# Patient Record
Sex: FEMALE | Race: BLACK OR AFRICAN AMERICAN | NOT HISPANIC OR LATINO | Employment: OTHER | URBAN - METROPOLITAN AREA
[De-identification: names, ages, dates, MRNs, and addresses within clinical notes are randomized per-mention and may not be internally consistent; named-entity substitution may affect disease eponyms.]

---

## 2023-04-20 ENCOUNTER — APPOINTMENT (OUTPATIENT)
Dept: PRIMARY CARE | Facility: CLINIC | Age: 68
End: 2023-04-20
Payer: MEDICARE

## 2023-04-27 ENCOUNTER — OFFICE VISIT (OUTPATIENT)
Dept: PRIMARY CARE | Facility: CLINIC | Age: 68
End: 2023-04-27
Payer: MEDICARE

## 2023-04-27 VITALS
SYSTOLIC BLOOD PRESSURE: 158 MMHG | RESPIRATION RATE: 12 BRPM | WEIGHT: 96.5 LBS | TEMPERATURE: 97.9 F | HEART RATE: 64 BPM | OXYGEN SATURATION: 100 % | BODY MASS INDEX: 20.26 KG/M2 | DIASTOLIC BLOOD PRESSURE: 70 MMHG | HEIGHT: 58 IN

## 2023-04-27 DIAGNOSIS — Z76.89 ESTABLISHING CARE WITH NEW DOCTOR, ENCOUNTER FOR: Primary | ICD-10-CM

## 2023-04-27 DIAGNOSIS — N39.46 MIXED STRESS AND URGE URINARY INCONTINENCE: ICD-10-CM

## 2023-04-27 DIAGNOSIS — Z65.8 INADEQUATE SOCIAL SUPPORT: ICD-10-CM

## 2023-04-27 DIAGNOSIS — E11.9 TYPE 2 DIABETES MELLITUS WITHOUT COMPLICATION, WITHOUT LONG-TERM CURRENT USE OF INSULIN (MULTI): Chronic | ICD-10-CM

## 2023-04-27 PROBLEM — R11.0 NAUSEA: Status: ACTIVE | Noted: 2023-04-27

## 2023-04-27 PROBLEM — J30.1 ALLERGIC RHINITIS DUE TO POLLEN: Status: ACTIVE | Noted: 2022-11-09

## 2023-04-27 PROBLEM — R51.9 HEADACHE: Status: ACTIVE | Noted: 2023-04-27

## 2023-04-27 PROBLEM — J30.0 NONALLERGIC VASOMOTOR RHINITIS: Status: ACTIVE | Noted: 2023-04-23

## 2023-04-27 PROBLEM — R42 VERTIGO: Status: ACTIVE | Noted: 2023-04-27

## 2023-04-27 PROBLEM — R32 INCONTINENCE: Status: ACTIVE | Noted: 2023-04-27

## 2023-04-27 PROBLEM — R51.9 HEADACHE: Status: RESOLVED | Noted: 2023-04-27 | Resolved: 2023-04-27

## 2023-04-27 PROBLEM — R11.0 NAUSEA: Status: RESOLVED | Noted: 2023-04-27 | Resolved: 2023-04-27

## 2023-04-27 PROBLEM — H93.13 TINNITUS OF BOTH EARS: Status: ACTIVE | Noted: 2023-04-23

## 2023-04-27 LAB — POC HEMOGLOBIN A1C: 7.4 % (ref 4.2–6.5)

## 2023-04-27 PROCEDURE — 1160F RVW MEDS BY RX/DR IN RCRD: CPT | Performed by: STUDENT IN AN ORGANIZED HEALTH CARE EDUCATION/TRAINING PROGRAM

## 2023-04-27 PROCEDURE — 3077F SYST BP >= 140 MM HG: CPT | Performed by: STUDENT IN AN ORGANIZED HEALTH CARE EDUCATION/TRAINING PROGRAM

## 2023-04-27 PROCEDURE — 1159F MED LIST DOCD IN RCRD: CPT | Performed by: STUDENT IN AN ORGANIZED HEALTH CARE EDUCATION/TRAINING PROGRAM

## 2023-04-27 PROCEDURE — 1036F TOBACCO NON-USER: CPT | Performed by: STUDENT IN AN ORGANIZED HEALTH CARE EDUCATION/TRAINING PROGRAM

## 2023-04-27 PROCEDURE — 99203 OFFICE O/P NEW LOW 30 MIN: CPT | Performed by: STUDENT IN AN ORGANIZED HEALTH CARE EDUCATION/TRAINING PROGRAM

## 2023-04-27 PROCEDURE — 83036 HEMOGLOBIN GLYCOSYLATED A1C: CPT | Performed by: STUDENT IN AN ORGANIZED HEALTH CARE EDUCATION/TRAINING PROGRAM

## 2023-04-27 PROCEDURE — 3078F DIAST BP <80 MM HG: CPT | Performed by: STUDENT IN AN ORGANIZED HEALTH CARE EDUCATION/TRAINING PROGRAM

## 2023-04-27 RX ORDER — DICLOFENAC SODIUM 50 MG/1
50 TABLET, DELAYED RELEASE ORAL
COMMUNITY
Start: 2020-01-08 | End: 2023-08-22 | Stop reason: ALTCHOICE

## 2023-04-27 RX ORDER — IBUPROFEN 800 MG/1
TABLET ORAL
COMMUNITY
Start: 2023-03-06

## 2023-04-27 RX ORDER — VALACYCLOVIR HYDROCHLORIDE 500 MG/1
1000 TABLET, FILM COATED ORAL DAILY PRN
COMMUNITY
Start: 2019-10-08

## 2023-04-27 RX ORDER — TIMOLOL MALEATE 5 MG/ML
SOLUTION/ DROPS OPHTHALMIC
COMMUNITY
Start: 2023-03-08

## 2023-04-27 RX ORDER — MONTELUKAST SODIUM 10 MG/1
10 TABLET ORAL NIGHTLY
COMMUNITY
Start: 2022-05-19

## 2023-04-27 RX ORDER — CLINDAMYCIN HYDROCHLORIDE 150 MG/1
150 CAPSULE ORAL 3 TIMES DAILY
COMMUNITY
Start: 2023-03-06 | End: 2023-08-03 | Stop reason: ALTCHOICE

## 2023-04-27 RX ORDER — ONDANSETRON 4 MG/1
TABLET, ORALLY DISINTEGRATING ORAL
COMMUNITY
End: 2023-08-03 | Stop reason: ALTCHOICE

## 2023-04-27 RX ORDER — CHLORHEXIDINE GLUCONATE ORAL RINSE 1.2 MG/ML
SOLUTION DENTAL
COMMUNITY
Start: 2023-03-06 | End: 2023-08-03 | Stop reason: ALTCHOICE

## 2023-04-27 RX ORDER — BLOOD SUGAR DIAGNOSTIC
STRIP MISCELLANEOUS
COMMUNITY
Start: 2022-08-23

## 2023-04-27 RX ORDER — OXYBUTYNIN CHLORIDE 5 MG/1
5 TABLET, EXTENDED RELEASE ORAL DAILY
COMMUNITY
Start: 2023-04-21 | End: 2023-05-18 | Stop reason: SDUPTHER

## 2023-04-27 RX ORDER — TAMSULOSIN HYDROCHLORIDE 0.4 MG/1
0.4 CAPSULE ORAL DAILY
COMMUNITY
Start: 2023-01-17 | End: 2023-08-03 | Stop reason: ALTCHOICE

## 2023-04-27 RX ORDER — LANCETS
EACH MISCELLANEOUS
COMMUNITY
Start: 2022-08-23

## 2023-04-27 RX ORDER — METFORMIN HYDROCHLORIDE 500 MG/1
500 TABLET, EXTENDED RELEASE ORAL 3 TIMES DAILY
COMMUNITY
Start: 2023-03-08

## 2023-04-27 RX ORDER — MINOXIDIL 10 MG/1
TABLET ORAL
COMMUNITY
Start: 2023-03-10

## 2023-04-27 RX ORDER — SIMVASTATIN 20 MG/1
TABLET, FILM COATED ORAL
COMMUNITY
Start: 2023-03-25

## 2023-04-27 RX ORDER — BETAMETHASONE DIPROPIONATE 0.5 MG/G
LOTION TOPICAL
COMMUNITY
Start: 2023-03-10

## 2023-04-27 ASSESSMENT — ANXIETY QUESTIONNAIRES
GAD7 TOTAL SCORE: 7
2. NOT BEING ABLE TO STOP OR CONTROL WORRYING: SEVERAL DAYS
5. BEING SO RESTLESS THAT IT IS HARD TO SIT STILL: NOT AT ALL
IF YOU CHECKED OFF ANY PROBLEMS ON THIS QUESTIONNAIRE, HOW DIFFICULT HAVE THESE PROBLEMS MADE IT FOR YOU TO DO YOUR WORK, TAKE CARE OF THINGS AT HOME, OR GET ALONG WITH OTHER PEOPLE: SOMEWHAT DIFFICULT
3. WORRYING TOO MUCH ABOUT DIFFERENT THINGS: MORE THAN HALF THE DAYS
4. TROUBLE RELAXING: SEVERAL DAYS
1. FEELING NERVOUS, ANXIOUS, OR ON EDGE: MORE THAN HALF THE DAYS
6. BECOMING EASILY ANNOYED OR IRRITABLE: NOT AT ALL
7. FEELING AFRAID AS IF SOMETHING AWFUL MIGHT HAPPEN: SEVERAL DAYS

## 2023-04-27 ASSESSMENT — PATIENT HEALTH QUESTIONNAIRE - PHQ9
SUM OF ALL RESPONSES TO PHQ9 QUESTIONS 1 AND 2: 2
1. LITTLE INTEREST OR PLEASURE IN DOING THINGS: SEVERAL DAYS
2. FEELING DOWN, DEPRESSED OR HOPELESS: SEVERAL DAYS

## 2023-04-27 ASSESSMENT — LIFESTYLE VARIABLES
AUDIT-C TOTAL SCORE: 0
SKIP TO QUESTIONS 9-10: 1
HOW OFTEN DO YOU HAVE SIX OR MORE DRINKS ON ONE OCCASION: NEVER
HOW OFTEN DO YOU HAVE A DRINK CONTAINING ALCOHOL: NEVER
HOW MANY STANDARD DRINKS CONTAINING ALCOHOL DO YOU HAVE ON A TYPICAL DAY: PATIENT DOES NOT DRINK

## 2023-04-27 NOTE — PATIENT INSTRUCTIONS
A1c: 7.8 to 7.4 Improved.   Cont with your current medications  Annual diabetic eye and foot exam    Referral to our clinical pharmacist.   Return in 3-4 months or as needed

## 2023-04-27 NOTE — PROGRESS NOTES
CHW met with pt in office to discuss her SDOH referral for Social Help with her fiance. CHW gave pt an Older Adult Resource Guide with pamphlets on care giver assistance for her fiance. Pt will look over the papers and let CHW know if she need further help

## 2023-04-27 NOTE — PROGRESS NOTES
Subjective   Patient ID: Nazia Warner is a pleasant 68 y.o. female who presents for Establish Care.  HPI  Patient has significant history of GERD, diabetes (HbA1c 7.8 January 2023), vertigo , urinary incontinence here to establish care.  She is caring for her fiance who is ill.  And patient herself resides in [place] however she goes back and forth between [place] and [place] to care for her significant other.  She is under a lot of stress and feels burned out caring for her fiancé.  Requesting referral to our  for resources.    Regarding her diabetes her HbA1c 3 months ago was 7.8.  Today it has improved to 7.4.  She is currently taking metformin 500 mg 1 tablet in the morning and 2 tablets at bedtime.    She also has mixed urinary incontinence and sees urology in [place].  She was following up with them pelvic physical therapy and would like to establish care with them in [place] as well.    Review of Systems   All other systems reviewed and are negative.      Visit Vitals  /70   Pulse 64   Temp 36.6 °C (97.9 °F)   Resp 12          Objective   Physical Exam  Constitutional:       General: She is not in acute distress.     Appearance: Normal appearance.   HENT:      Head: Normocephalic and atraumatic.   Eyes:      General: No scleral icterus.     Conjunctiva/sclera: Conjunctivae normal.   Cardiovascular:      Rate and Rhythm: Normal rate and regular rhythm.      Heart sounds: Normal heart sounds.   Pulmonary:      Effort: Pulmonary effort is normal.      Breath sounds: Normal breath sounds. No wheezing.   Abdominal:      General: Bowel sounds are normal. There is no distension.      Palpations: Abdomen is soft.      Tenderness: There is no abdominal tenderness.   Musculoskeletal:      Cervical back: Neck supple.      Right lower leg: No edema.      Left lower leg: No edema.   Lymphadenopathy:      Cervical: No cervical adenopathy.   Skin:     General: Skin is warm and dry.   Neurological:       General: No focal deficit present.      Mental Status: She is alert and oriented to person, place, and time.   Psychiatric:         Mood and Affect: Mood normal.         Behavior: Behavior normal.         Assessment/Plan   Problem List Items Addressed This Visit          Endocrine/Metabolic    Type 2 diabetes mellitus without complication (CMS/HCC) (Chronic)     A1c 7.8 1/2023         Relevant Orders    POCT glycosylated hemoglobin (Hb A1C) manually resulted (Completed)    Follow Up In [facility]       Other    Incontinence    Relevant Orders    Referral to Physical Therapy     Other Visit Diagnoses       Establishing care with new doctor, encounter for    -  Primary    Inadequate social support        Relevant Orders    Referral to Community Health Worker - [facility]

## 2023-04-28 ENCOUNTER — TELEMEDICINE (OUTPATIENT)
Dept: PHARMACY | Facility: HOSPITAL | Age: 68
End: 2023-04-28
Payer: MEDICARE

## 2023-04-28 DIAGNOSIS — E11.9 TYPE 2 DIABETES MELLITUS WITHOUT COMPLICATION, WITHOUT LONG-TERM CURRENT USE OF INSULIN (MULTI): Chronic | ICD-10-CM

## 2023-04-28 RX ORDER — DAPAGLIFLOZIN 5 MG/1
5 TABLET, FILM COATED ORAL EVERY MORNING
Qty: 30 TABLET | Refills: 0 | Status: SHIPPED | OUTPATIENT
Start: 2023-04-28 | End: 2023-05-18 | Stop reason: SDUPTHER

## 2023-04-28 NOTE — PROGRESS NOTES
Subjective     Patient ID: Nazia Warner is a 68 y.o. female who presents for Diabetes.    Diabetes  She presents for her initial diabetic visit. She has type 2 diabetes mellitus. Her disease course has been improving. She is compliant with treatment all of the time. She is following a generally healthy diet. Meal planning includes avoidance of concentrated sweets. She participates in exercise intermittently. There is no change in her home blood glucose trend.       Allergies   Allergen Reactions    Iodinated Contrast Media Rash and Hives    Iodine Rash and Wheezing     Other reaction(s): Unknown    Penicillins Rash and Hives    Sulfa (Sulfonamide Antibiotics) Hives, Itching, Rash and Unknown     A    Monosodium Glutamate Other     Dizziness. 6/16/15.bw    Other reaction(s): Other   Dizziness. 6/16/15.bw    Amoxicillin Unknown       Objective       SECONDARY PREVENTION  - Statin? Yes  - ACE-I/ARB? No  - Aspirin? No    Current monitoring regimen:   Patient is using: glucometer    SMBG Readings: 's - PPG  mg/dL     Any episodes of hypoglycemia? No  Hypoglycemia awareness? Yes    There were no vitals taken for this visit.    Pertinent PMH Review:    - PMH of Urinary Tract Infections: Yes - related to antibiotic use     Lab Review  Lab Results   Component Value Date    BILITOT 0.4 01/15/2023    CALCIUM 10.0 01/15/2023    CO2 24 01/15/2023     01/15/2023    CREATININE 1.05 01/15/2023    GLUCOSE 198 (H) 01/15/2023    ALKPHOS 57 01/15/2023    K 4.6 01/15/2023    PROT 8.0 01/15/2023     01/15/2023    AST 23 01/15/2023    ALT 31 01/15/2023    BUN 15 01/15/2023    ANIONGAP 18 01/15/2023    ALBUMIN 4.7 01/15/2023    LIPASE 45 01/15/2023    GFRF 58 (A) 01/15/2023     No results found for: TRIG, CHOL, LDL, LDLCALC, HDL  Lab Results   Component Value Date    HGBA1C 7.4 (A) 04/27/2023     The ASCVD Risk score (Lennox WEAVER, et al., 2019) failed to calculate for the following reasons:    Cannot find a previous  HDL lab    Cannot find a previous total cholesterol lab    Current DM Pharmacotherapy:   Metformin  mg- 1 tablet every morning and 2 tablets every evening     Assessment/Plan     Problem List Items Addressed This Visit          Endocrine/Metabolic    Type 2 diabetes mellitus without complication (CMS/HCC) (Chronic)     A1C above goal. Agreeable to new med.     START Farxiga 5 mg once daily in the morning   CONTINUE Metformin 500 mg ER - 3 tablets daily,          Relevant Medications    dapagliflozin (Farxiga) 5 mg       Type 2 diabetes mellitus, is not at goal. Goal <7%    Follow up: I recommend diabetes care be 3 weeks.    Josie Rodrigeuz PharmD [facility]  Clinical Pharmacist

## 2023-04-28 NOTE — ASSESSMENT & PLAN NOTE
A1C above goal. Agreeable to new med.     START Farxiga 5 mg once daily in the morning   CONTINUE Metformin 500 mg ER - 3 tablets daily,

## 2023-05-18 ENCOUNTER — TELEMEDICINE (OUTPATIENT)
Dept: PHARMACY | Facility: HOSPITAL | Age: 68
End: 2023-05-18
Payer: MEDICARE

## 2023-05-18 DIAGNOSIS — N39.46 MIXED STRESS AND URGE URINARY INCONTINENCE: Primary | ICD-10-CM

## 2023-05-18 DIAGNOSIS — E11.9 TYPE 2 DIABETES MELLITUS WITHOUT COMPLICATION, WITHOUT LONG-TERM CURRENT USE OF INSULIN (MULTI): Chronic | ICD-10-CM

## 2023-05-18 RX ORDER — OXYBUTYNIN CHLORIDE 5 MG/1
5 TABLET, EXTENDED RELEASE ORAL DAILY
Qty: 90 TABLET | Refills: 0 | Status: SHIPPED | OUTPATIENT
Start: 2023-05-18

## 2023-05-18 RX ORDER — DAPAGLIFLOZIN 5 MG/1
5 TABLET, FILM COATED ORAL EVERY MORNING
Qty: 90 TABLET | Refills: 1 | Status: SHIPPED | OUTPATIENT
Start: 2023-05-18 | End: 2023-12-05 | Stop reason: SDUPTHER

## 2023-05-18 NOTE — ASSESSMENT & PLAN NOTE
A1C above goal, home SMBG within goal. Some increased urination/dehydration with Farxiga but manageable per patient.     CONTINUE:   Farxiga 5 mg - once daily   Metformin  mg - 3 tablets daily   CGM requires PA, will submit

## 2023-05-18 NOTE — PROGRESS NOTES
Subjective     Patient ID: Nazia Warner is a 68 y.o. female who presents for Diabetes.    Diabetes  She presents for her initial diabetic visit. She has type 2 diabetes mellitus. Her disease course has been improving. She is compliant with treatment all of the time. She is following a generally healthy diet. Meal planning includes avoidance of concentrated sweets. She participates in exercise intermittently. There is no change in her home blood glucose trend.       Allergies   Allergen Reactions    Iodinated Contrast Media Rash and Hives    Iodine Rash and Wheezing     Other reaction(s): Unknown    Penicillins Rash and Hives    Sulfa (Sulfonamide Antibiotics) Hives, Itching, Rash and Unknown     A    Monosodium Glutamate Other     Dizziness. 6/16/15.bw    Other reaction(s): Other   Dizziness. 6/16/15.bw    Amoxicillin Unknown       Objective       SECONDARY PREVENTION  - Statin? Yes  - ACE-I/ARB? No  - Aspirin? No    Current monitoring regimen:   Patient is using: glucometer    SMBG Readings: 's - PPG  mg/dL     Any episodes of hypoglycemia? No  Hypoglycemia awareness? Yes    There were no vitals taken for this visit.    Pertinent PMH Review:    - PMH of Urinary Tract Infections: Yes - related to antibiotic use     Lab Review  Lab Results   Component Value Date    BILITOT 0.4 01/15/2023    CALCIUM 10.0 01/15/2023    CO2 24 01/15/2023     01/15/2023    CREATININE 1.05 01/15/2023    GLUCOSE 198 (H) 01/15/2023    ALKPHOS 57 01/15/2023    K 4.6 01/15/2023    PROT 8.0 01/15/2023     01/15/2023    AST 23 01/15/2023    ALT 31 01/15/2023    BUN 15 01/15/2023    ANIONGAP 18 01/15/2023    ALBUMIN 4.7 01/15/2023    LIPASE 45 01/15/2023    GFRF 58 (A) 01/15/2023     No results found for: TRIG, CHOL, LDL, LDLCALC, HDL  Lab Results   Component Value Date    HGBA1C 7.4 (A) 04/27/2023     The ASCVD Risk score (Lennox WEAVER, et al., 2019) failed to calculate for the following reasons:    Cannot find a previous  HDL lab    Cannot find a previous total cholesterol lab    Current DM Pharmacotherapy:   Metformin  mg- 1 tablet every morning and 2 tablets every evening   Farxiga 5 mg - 1 tablet once daily     Assessment/Plan     Problem List Items Addressed This Visit          Endocrine/Metabolic    Type 2 diabetes mellitus without complication (CMS/HCC) (Chronic)     A1C above goal, home SMBG within goal. Some increased urination/dehydration with Farxiga but manageable per patient.     CONTINUE:   Farxiga 5 mg - once daily   Metformin  mg - 3 tablets daily   CGM requires PA, will submit          Relevant Medications    dapagliflozin (Farxiga) 5 mg       Other    Incontinence - Primary    Relevant Medications    oxybutynin XL (Ditropan-XL) 5 mg 24 hr tablet       Type 2 diabetes mellitus, is not at goal. Goal <7%    Follow up: I recommend diabetes care be as needed  Will call patient when PA for CGM determined    Josie Rodriguez PharmD [facility]  Clinical Pharmacist

## 2023-06-13 ENCOUNTER — TELEMEDICINE (OUTPATIENT)
Dept: PHARMACY | Facility: HOSPITAL | Age: 68
End: 2023-06-13
Payer: MEDICARE

## 2023-06-13 DIAGNOSIS — E11.9 TYPE 2 DIABETES MELLITUS WITHOUT COMPLICATION, WITHOUT LONG-TERM CURRENT USE OF INSULIN (MULTI): Primary | Chronic | ICD-10-CM

## 2023-06-13 DIAGNOSIS — E11.9 TYPE 2 DIABETES MELLITUS WITHOUT COMPLICATION, WITHOUT LONG-TERM CURRENT USE OF INSULIN (MULTI): Chronic | ICD-10-CM

## 2023-06-13 NOTE — PROGRESS NOTES
Subjective     Patient ID: Nazia Warner is a 68 y.o. female who presents for Diabetes.    Diabetes  She presents for her initial diabetic visit. She has type 2 diabetes mellitus. Her disease course has been improving. She is compliant with treatment all of the time. She is following a generally healthy diet. Meal planning includes avoidance of concentrated sweets. She participates in exercise intermittently. Her home blood glucose trend is decreasing steadily.     Patient splits time between [place] and [place], has PCP's in both states.     Discussed costs associated with Farxiga, patient not eligible at this time for [facility] based on income.     Allergies   Allergen Reactions    Iodinated Contrast Media Rash and Hives    Iodine Rash and Wheezing     Other reaction(s): Unknown    Penicillins Rash and Hives    Sulfa (Sulfonamide Antibiotics) Hives, Itching, Rash and Unknown     A    Monosodium Glutamate Other     Dizziness. 6/16/15.bw    Other reaction(s): Other   Dizziness. 6/16/15.bw    Amoxicillin Unknown       Objective     Current DM Pharmacotherapy:   Farxiga 5 mg - 1 tablet once daily   Metformin  mg - 3 tablets daily     SECONDARY PREVENTION  - Statin? Yes  - ACE-I/ARB? No  - Aspirin? No    Current monitoring regimen:   Patient is using: glucometer    SMBG Readings: -110 - PPG Max 140 mg/dL     Any episodes of hypoglycemia? No  Hypoglycemia awareness? Yes    There were no vitals taken for this visit.    Pertinent PMH Review:    - PMH of Urinary Tract Infections: Yes - related to antibiotic use     Lab Review  Lab Results   Component Value Date    BILITOT 0.4 01/15/2023    CALCIUM 10.0 01/15/2023    CO2 24 01/15/2023     01/15/2023    CREATININE 1.05 01/15/2023    GLUCOSE 198 (H) 01/15/2023    ALKPHOS 57 01/15/2023    K 4.6 01/15/2023    PROT 8.0 01/15/2023     01/15/2023    AST 23 01/15/2023    ALT 31 01/15/2023    BUN 15 01/15/2023    ANIONGAP 18 01/15/2023    ALBUMIN 4.7 01/15/2023     "LIPASE 45 01/15/2023    GFRF 58 (A) 01/15/2023     No results found for: \"TRIG\", \"CHOL\", \"LDL\", \"LDLCALC\", \"HDL\"  Lab Results   Component Value Date    HGBA1C 7.4 (A) 04/27/2023     The ASCVD Risk score (Lennox DK, et al., 2019) failed to calculate for the following reasons:    Cannot find a previous HDL lab    Cannot find a previous total cholesterol lab    Assessment/Plan     Problem List Items Addressed This Visit       Type 2 diabetes mellitus without complication (CMS/HCC) (Chronic)     SMBG improved with addition of Farxiga. No GEORGE. Not eligible for CGM through insurance without insulin therapy or documented history of hypoglycemic episodes.     CONTINUE:   Metformin   Farxiga           Discussed combining Metformin and Farxiga into combination Xigduo (~$43.50 for 30 DS). Given current doses of medications would recommend opting for Xigduo XR  mg once daily.     Type 2 diabetes mellitus, is not at goal. Goal <7%    Follow-up: 8/4/23 @ 10 AM   Obtain new A1C/CMP on or after 7/20/23    Josie Rodriguez PharmART [facility]  Clinical Pharmacist     "

## 2023-06-13 NOTE — ASSESSMENT & PLAN NOTE
SMBG improved with addition of Farxiga. No GEORGE. Not eligible for CGM through insurance without insulin therapy or documented history of hypoglycemic episodes.     CONTINUE:   Metformin   Farxiga

## 2023-08-03 ENCOUNTER — TELEMEDICINE (OUTPATIENT)
Dept: PHARMACY | Facility: HOSPITAL | Age: 68
End: 2023-08-03
Payer: MEDICARE

## 2023-08-03 DIAGNOSIS — E11.9 TYPE 2 DIABETES MELLITUS WITHOUT COMPLICATION, WITHOUT LONG-TERM CURRENT USE OF INSULIN (MULTI): Chronic | ICD-10-CM

## 2023-08-03 NOTE — PROGRESS NOTES
Subjective     Patient ID: Nazia Warner is a 68 y.o. female who presents for Diabetes.    Diabetes  She presents for her initial diabetic visit. She has type 2 diabetes mellitus. Her disease course has been stable. She is compliant with treatment all of the time. She is following a generally healthy diet. Meal planning includes avoidance of concentrated sweets. She participates in exercise intermittently. There is no change in her home blood glucose trend.     Patient splits time between [place] and [place], has PCP's in both states.     Most recent A1C (7/18) taken in [place]: 6.7%  Most recent eGFR: 74      Allergies   Allergen Reactions    Iodinated Contrast Media Rash and Hives    Iodine Rash and Wheezing     Other reaction(s): Unknown    Penicillins Rash and Hives    Sulfa (Sulfonamide Antibiotics) Hives, Itching, Rash and Unknown     A    Monosodium Glutamate Other     Dizziness. 6/16/15.bw    Other reaction(s): Other   Dizziness. 6/16/15.bw    Amoxicillin Unknown       Objective     Current DM Pharmacotherapy:   Farxiga 5 mg - 1 tablet once daily   Metformin  mg - 3 tablets daily (often skips 3rd tablet if BG <100 mg/dL at night)     SECONDARY PREVENTION  - Statin? Yes  - ACE-I/ARB? No  - Aspirin? No    Current monitoring regimen:   Patient is using: glucometer    SMBG Readings Very well controlled     Any episodes of hypoglycemia? No  Hypoglycemia awareness? Yes    There were no vitals taken for this visit.    Pertinent PMH Review:    - PMH of Urinary Tract Infections: Yes - related to antibiotic use     Lab Review  Lab Results   Component Value Date    BILITOT 0.4 01/15/2023    CALCIUM 10.0 01/15/2023    CO2 24 01/15/2023     01/15/2023    CREATININE 1.05 01/15/2023    GLUCOSE 198 (H) 01/15/2023    ALKPHOS 57 01/15/2023    K 4.6 01/15/2023    PROT 8.0 01/15/2023     01/15/2023    AST 23 01/15/2023    ALT 31 01/15/2023    BUN 15 01/15/2023    ANIONGAP 18 01/15/2023    ALBUMIN 4.7 01/15/2023  "   LIPASE 45 01/15/2023    GFRF 58 (A) 01/15/2023     No results found for: \"TRIG\", \"CHOL\", \"LDL\", \"LDLCALC\", \"HDL\"  Lab Results   Component Value Date    HGBA1C 7.4 (A) 04/27/2023     The ASCVD Risk score (Lennox WEAVER, et al., 2019) failed to calculate for the following reasons:    Cannot find a previous HDL lab    Cannot find a previous total cholesterol lab    Assessment/Plan     Problem List Items Addressed This Visit       Type 2 diabetes mellitus without complication (CMS/HCC) (Chronic)     BG well controlled on current doses. No ADEs.     CONTINUE:   Farxiga 5 mg - 1 tablet once daily   Metformin  mg - 3 tablets daily          Relevant Orders    Follow Up In [facility] Pharmacy     Discussed combining Metformin and Farxiga into combination Xigduo (~$43.50 for 30 DS). Given current doses of medications would recommend opting for Xigduo XR  mg once daily.     Type 2 diabetes mellitus, is not at goal. Goal <7%    Follow-up: 8 weeks    Josie Rodriguez PharmD [facility]  Clinical Pharmacist     "

## 2023-08-03 NOTE — ASSESSMENT & PLAN NOTE
BG well controlled on current doses. No ADEs.     CONTINUE:   Farxiga 5 mg - 1 tablet once daily   Metformin  mg - 3 tablets daily

## 2023-08-04 ENCOUNTER — APPOINTMENT (OUTPATIENT)
Dept: PHARMACY | Facility: HOSPITAL | Age: 68
End: 2023-08-04
Payer: MEDICARE

## 2023-08-22 ENCOUNTER — OFFICE VISIT (OUTPATIENT)
Dept: PRIMARY CARE | Facility: CLINIC | Age: 68
End: 2023-08-22
Payer: MEDICARE

## 2023-08-22 VITALS
HEART RATE: 67 BPM | RESPIRATION RATE: 16 BRPM | TEMPERATURE: 97.2 F | WEIGHT: 92.2 LBS | BODY MASS INDEX: 19.35 KG/M2 | OXYGEN SATURATION: 100 % | HEIGHT: 58 IN | SYSTOLIC BLOOD PRESSURE: 138 MMHG | DIASTOLIC BLOOD PRESSURE: 62 MMHG

## 2023-08-22 DIAGNOSIS — Z78.0 ASYMPTOMATIC MENOPAUSAL STATE: ICD-10-CM

## 2023-08-22 DIAGNOSIS — H93.13 TINNITUS OF BOTH EARS: Primary | ICD-10-CM

## 2023-08-22 DIAGNOSIS — F32.A DEPRESSION, UNSPECIFIED DEPRESSION TYPE: ICD-10-CM

## 2023-08-22 PROCEDURE — 99213 OFFICE O/P EST LOW 20 MIN: CPT | Performed by: STUDENT IN AN ORGANIZED HEALTH CARE EDUCATION/TRAINING PROGRAM

## 2023-08-22 PROCEDURE — 1159F MED LIST DOCD IN RCRD: CPT | Performed by: STUDENT IN AN ORGANIZED HEALTH CARE EDUCATION/TRAINING PROGRAM

## 2023-08-22 PROCEDURE — 1160F RVW MEDS BY RX/DR IN RCRD: CPT | Performed by: STUDENT IN AN ORGANIZED HEALTH CARE EDUCATION/TRAINING PROGRAM

## 2023-08-22 PROCEDURE — 3078F DIAST BP <80 MM HG: CPT | Performed by: STUDENT IN AN ORGANIZED HEALTH CARE EDUCATION/TRAINING PROGRAM

## 2023-08-22 PROCEDURE — 1036F TOBACCO NON-USER: CPT | Performed by: STUDENT IN AN ORGANIZED HEALTH CARE EDUCATION/TRAINING PROGRAM

## 2023-08-22 PROCEDURE — 3075F SYST BP GE 130 - 139MM HG: CPT | Performed by: STUDENT IN AN ORGANIZED HEALTH CARE EDUCATION/TRAINING PROGRAM

## 2023-08-22 RX ORDER — UBIDECARENONE 30 MG
60 CAPSULE ORAL
COMMUNITY

## 2023-08-22 RX ORDER — DAPAGLIFLOZIN AND METFORMIN HYDROCHLORIDE 10; 1000 MG/1; MG/1
TABLET, FILM COATED, EXTENDED RELEASE ORAL
COMMUNITY
Start: 2023-06-13 | End: 2023-08-22 | Stop reason: ALTCHOICE

## 2023-08-22 RX ORDER — AZELASTINE 1 MG/ML
SPRAY, METERED NASAL EVERY 12 HOURS
COMMUNITY
Start: 2018-07-24

## 2023-08-22 RX ORDER — DAPAGLIFLOZIN AND METFORMIN HYDROCHLORIDE 10; 1000 MG/1; MG/1
TABLET, FILM COATED, EXTENDED RELEASE ORAL
COMMUNITY
End: 2023-09-21

## 2023-08-22 ASSESSMENT — LIFESTYLE VARIABLES
SKIP TO QUESTIONS 9-10: 1
HOW OFTEN DO YOU HAVE A DRINK CONTAINING ALCOHOL: NEVER
AUDIT-C TOTAL SCORE: 0
HOW OFTEN DO YOU HAVE SIX OR MORE DRINKS ON ONE OCCASION: NEVER
HOW MANY STANDARD DRINKS CONTAINING ALCOHOL DO YOU HAVE ON A TYPICAL DAY: PATIENT DOES NOT DRINK

## 2023-08-22 ASSESSMENT — PATIENT HEALTH QUESTIONNAIRE - PHQ9
2. FEELING DOWN, DEPRESSED OR HOPELESS: NOT AT ALL
1. LITTLE INTEREST OR PLEASURE IN DOING THINGS: NOT AT ALL
SUM OF ALL RESPONSES TO PHQ9 QUESTIONS 1 & 2: 0

## 2023-08-22 NOTE — PATIENT INSTRUCTIONS
Please schedule your medicare wellness visit   Bone density ordered  Referral to ENT and audiology  Referral to psychiatry for depression evaluation

## 2023-08-22 NOTE — PROGRESS NOTES
Subjective   Patient ID: Nazia Warner is a pleasant 68 y.o. female with significant history of type 2 diabetes (HbA1c 6.7 in July 2023), vertigo, urinary incontinence GERD who presents for Tinnitus.  HPI  Patient is here for follow-up.  She has been experiencing tinnitus.  She has tinnitus since 2015. No dizziness   She was supposed to have hearing test. Needs a new referral   To audiology.   She also reports that lately she has been depressed and overwhelmed taking care of her ill  would like to see psychiatry.  Denies any HI or SI.  Reports that many years ago while she was in college and she was treated for depression however she has not been on any medications.  Patient is provided with resources for behavioral health facilities.    Review of Systems   All other systems reviewed and are negative.      Visit Vitals  /62   Pulse 67   Temp 36.2 °C (97.2 °F)   Resp 16          Objective   Physical Exam  Constitutional:       General: She is not in acute distress.     Appearance: Normal appearance.   HENT:      Head: Normocephalic and atraumatic.   Eyes:      General: No scleral icterus.     Conjunctiva/sclera: Conjunctivae normal.   Cardiovascular:      Rate and Rhythm: Normal rate and regular rhythm.      Heart sounds: Normal heart sounds.   Pulmonary:      Effort: Pulmonary effort is normal.      Breath sounds: Normal breath sounds. No wheezing.   Abdominal:      General: Bowel sounds are normal. There is no distension.      Palpations: Abdomen is soft.      Tenderness: There is no abdominal tenderness.   Musculoskeletal:      Cervical back: Neck supple.      Right lower leg: No edema.      Left lower leg: No edema.   Lymphadenopathy:      Cervical: No cervical adenopathy.   Skin:     General: Skin is warm and dry.   Neurological:      General: No focal deficit present.      Mental Status: She is alert and oriented to person, place, and time.   Psychiatric:         Mood and Affect: Mood normal.          Behavior: Behavior normal.         Assessment/Plan   Problem List Items Addressed This Visit       Tinnitus of both ears - Primary    Relevant Orders    Referral to Audiology    Referral to ENT     Other Visit Diagnoses       Asymptomatic menopausal state        Relevant Orders    XR DEXA bone density    Depression, unspecified depression type        Relevant Orders    Referral to Psychiatry

## 2023-09-21 ENCOUNTER — TELEMEDICINE (OUTPATIENT)
Dept: PHARMACY | Facility: HOSPITAL | Age: 68
End: 2023-09-21
Payer: MEDICARE

## 2023-09-21 DIAGNOSIS — E11.9 TYPE 2 DIABETES MELLITUS WITHOUT COMPLICATION, WITHOUT LONG-TERM CURRENT USE OF INSULIN (MULTI): Chronic | ICD-10-CM

## 2023-09-21 NOTE — ASSESSMENT & PLAN NOTE
Patient continues to be well controlled, continue med regimen as prescribed at this time. Will keep Metformin and Farxiga separate due to cost concerns.

## 2023-09-21 NOTE — PROGRESS NOTES
Subjective     Patient ID: Nazia Warner is a 68 y.o. female who presents for Diabetes.    Diabetes  She presents for her initial diabetic visit. She has type 2 diabetes mellitus. Her disease course has been stable. She is compliant with treatment all of the time. She is following a generally healthy diet. Meal planning includes avoidance of concentrated sweets. She participates in exercise intermittently. There is no change in her home blood glucose trend.     Patient splits time between [place] and [place], has PCP's in both states.     Most recent A1C (7/18) taken in [place]: 6.7%  Most recent eGFR: 74      Allergies   Allergen Reactions    Iodinated Contrast Media Rash and Hives    Iodine Rash and Wheezing     Other reaction(s): Unknown    Penicillins Rash and Hives    Sulfa (Sulfonamide Antibiotics) Hives, Itching, Rash and Unknown     A    Monosodium Glutamate Other     Dizziness. 6/16/15.bw    Other reaction(s): Other   Dizziness. 6/16/15.bw    Amoxicillin Unknown       Objective     Current DM Pharmacotherapy:   Farxiga 5 mg - 1 tablet once daily   Metformin  mg - 3 tablets daily (often skips 3rd tablet if BG <100 mg/dL at night)     SECONDARY PREVENTION  - Statin? Yes  - ACE-I/ARB? No  - Aspirin? No    Current monitoring regimen:   Patient is using: glucometer    SMBG Readings Very well controlled     Any episodes of hypoglycemia? No  Hypoglycemia awareness? Yes    There were no vitals taken for this visit.    Pertinent PMH Review:    - PMH of Urinary Tract Infections: Yes - related to antibiotic use     Lab Review  Lab Results   Component Value Date    BILITOT 0.4 01/15/2023    CALCIUM 10.0 01/15/2023    CO2 24 01/15/2023     01/15/2023    CREATININE 1.05 01/15/2023    GLUCOSE 198 (H) 01/15/2023    ALKPHOS 57 01/15/2023    K 4.6 01/15/2023    PROT 8.0 01/15/2023     01/15/2023    AST 23 01/15/2023    ALT 31 01/15/2023    BUN 15 01/15/2023    ANIONGAP 18 01/15/2023    ALBUMIN 4.7 01/15/2023  "   LIPASE 45 01/15/2023    GFRF 58 (A) 01/15/2023     No results found for: \"TRIG\", \"CHOL\", \"LDL\", \"LDLCALC\", \"HDL\"  Lab Results   Component Value Date    HGBA1C 7.4 (A) 04/27/2023     The ASCVD Risk score (Lennox WEAVER, et al., 2019) failed to calculate for the following reasons:    Cannot find a previous HDL lab    Cannot find a previous total cholesterol lab    Assessment/Plan     Problem List Items Addressed This Visit       Type 2 diabetes mellitus without complication (CMS/HCC) (Chronic)     Patient continues to be well controlled, continue med regimen as prescribed at this time. Will keep Metformin and Farxiga separate due to cost concerns.          Discussed combining Metformin and Farxiga into combination Xigduo (~$43.50 for 30 DS). Given current doses of medications would recommend opting for Xigduo XR  mg once daily.     Type 2 diabetes mellitus, is not at goal. Goal <7%    Follow-up: as needed - pt to call if in need of refills     Josie Rodriguez PharmD [facility]  Clinical Pharmacist     "

## 2023-10-03 ENCOUNTER — OFFICE VISIT (OUTPATIENT)
Dept: PRIMARY CARE | Facility: CLINIC | Age: 68
End: 2023-10-03
Payer: MEDICARE

## 2023-10-03 VITALS
SYSTOLIC BLOOD PRESSURE: 120 MMHG | BODY MASS INDEX: 19.44 KG/M2 | OXYGEN SATURATION: 99 % | WEIGHT: 92.6 LBS | HEIGHT: 58 IN | RESPIRATION RATE: 12 BRPM | DIASTOLIC BLOOD PRESSURE: 56 MMHG | TEMPERATURE: 98 F | HEART RATE: 66 BPM

## 2023-10-03 DIAGNOSIS — E46 PROTEIN-CALORIE MALNUTRITION, UNSPECIFIED SEVERITY (MULTI): ICD-10-CM

## 2023-10-03 DIAGNOSIS — Z00.00 MEDICARE ANNUAL WELLNESS VISIT, SUBSEQUENT: Primary | ICD-10-CM

## 2023-10-03 DIAGNOSIS — Z01.419 WELL WOMAN EXAM WITH ROUTINE GYNECOLOGICAL EXAM: ICD-10-CM

## 2023-10-03 DIAGNOSIS — Z13.89 ENCOUNTER FOR SCREENING FOR OTHER DISORDER: ICD-10-CM

## 2023-10-03 DIAGNOSIS — E78.5 HYPERLIPIDEMIA LDL GOAL <100: ICD-10-CM

## 2023-10-03 DIAGNOSIS — E11.9 TYPE 2 DIABETES MELLITUS WITHOUT COMPLICATION, WITHOUT LONG-TERM CURRENT USE OF INSULIN (MULTI): Chronic | ICD-10-CM

## 2023-10-03 DIAGNOSIS — Z23 FLU VACCINE NEED: ICD-10-CM

## 2023-10-03 PROBLEM — R94.120 ABNORMAL OTOACOUSTIC EMISSIONS TEST: Status: ACTIVE | Noted: 2023-10-03

## 2023-10-03 PROCEDURE — 99397 PER PM REEVAL EST PAT 65+ YR: CPT | Performed by: STUDENT IN AN ORGANIZED HEALTH CARE EDUCATION/TRAINING PROGRAM

## 2023-10-03 PROCEDURE — 3074F SYST BP LT 130 MM HG: CPT | Performed by: STUDENT IN AN ORGANIZED HEALTH CARE EDUCATION/TRAINING PROGRAM

## 2023-10-03 PROCEDURE — 1170F FXNL STATUS ASSESSED: CPT | Performed by: STUDENT IN AN ORGANIZED HEALTH CARE EDUCATION/TRAINING PROGRAM

## 2023-10-03 PROCEDURE — G0439 PPPS, SUBSEQ VISIT: HCPCS | Performed by: STUDENT IN AN ORGANIZED HEALTH CARE EDUCATION/TRAINING PROGRAM

## 2023-10-03 PROCEDURE — 1159F MED LIST DOCD IN RCRD: CPT | Performed by: STUDENT IN AN ORGANIZED HEALTH CARE EDUCATION/TRAINING PROGRAM

## 2023-10-03 PROCEDURE — G0008 ADMIN INFLUENZA VIRUS VAC: HCPCS | Performed by: STUDENT IN AN ORGANIZED HEALTH CARE EDUCATION/TRAINING PROGRAM

## 2023-10-03 PROCEDURE — 3078F DIAST BP <80 MM HG: CPT | Performed by: STUDENT IN AN ORGANIZED HEALTH CARE EDUCATION/TRAINING PROGRAM

## 2023-10-03 PROCEDURE — G0444 DEPRESSION SCREEN ANNUAL: HCPCS | Performed by: STUDENT IN AN ORGANIZED HEALTH CARE EDUCATION/TRAINING PROGRAM

## 2023-10-03 PROCEDURE — 1160F RVW MEDS BY RX/DR IN RCRD: CPT | Performed by: STUDENT IN AN ORGANIZED HEALTH CARE EDUCATION/TRAINING PROGRAM

## 2023-10-03 PROCEDURE — 1036F TOBACCO NON-USER: CPT | Performed by: STUDENT IN AN ORGANIZED HEALTH CARE EDUCATION/TRAINING PROGRAM

## 2023-10-03 PROCEDURE — 90662 IIV NO PRSV INCREASED AG IM: CPT | Performed by: STUDENT IN AN ORGANIZED HEALTH CARE EDUCATION/TRAINING PROGRAM

## 2023-10-03 RX ORDER — ONDANSETRON 4 MG/1
TABLET, ORALLY DISINTEGRATING ORAL
COMMUNITY
Start: 2015-06-17 | End: 2024-04-02 | Stop reason: WASHOUT

## 2023-10-03 RX ORDER — NITROGLYCERIN 20 MG/G
OINTMENT TOPICAL
COMMUNITY
Start: 2023-08-29

## 2023-10-03 ASSESSMENT — ACTIVITIES OF DAILY LIVING (ADL)
DRESSING: INDEPENDENT
DOING_HOUSEWORK: INDEPENDENT
GROCERY_SHOPPING: INDEPENDENT
MANAGING_FINANCES: INDEPENDENT
TAKING_MEDICATION: INDEPENDENT
BATHING: INDEPENDENT

## 2023-10-03 ASSESSMENT — PATIENT HEALTH QUESTIONNAIRE - PHQ9
SUM OF ALL RESPONSES TO PHQ9 QUESTIONS 1 AND 2: 0
2. FEELING DOWN, DEPRESSED OR HOPELESS: NOT AT ALL
1. LITTLE INTEREST OR PLEASURE IN DOING THINGS: NOT AT ALL

## 2023-10-03 NOTE — PROGRESS NOTES
"Subjective   Reason for Visit: Nazia Warner is a pleasant 68 y.o. female with significant history of diabetes (HbA1c 6.7 7/18/2023), hyperlipidemia here for a Medicare Wellness visit.     Past Medical, Surgical, and Family History reviewed and updated in chart.    Reviewed all medications by prescribing practitioner or clinical pharmacist (such as prescriptions, OTCs, herbal therapies and supplements) and documented in the medical record.    HPI    Patient is here for Medicare wellness visit.  He is inquiring about a support group for patients who are on dialysis.  Her   Mr Romaine Gordon is currently on dialysis and she feels that she needs some support to go through with this.    Health maintenance:  Mammogram: 4/21/2023  Bone density: Ordered on 8/22/2022, pending completion in Dec.   Pap smear: referral to GYN.   Colonoscopy: 2014, repeat in 10 years.     Immunizations:  Flu shot today  UTD with PNA   Due for second dose of shingle     Patient Care Team:  Dorota Haynes MD as PCP - General (Hospitalist)     Review of Systems   All other systems reviewed and are negative.      Objective   Vitals:  /56 (Patient Position: Sitting)   Pulse 66   Temp 36.7 °C (98 °F)   Resp 12   Ht 1.473 m (4' 10\")   Wt (!) 42 kg (92 lb 9.6 oz)   SpO2 99%   BMI 19.35 kg/m²       Physical Exam  Constitutional:       General: She is not in acute distress.     Appearance: Normal appearance.   HENT:      Head: Normocephalic and atraumatic.   Eyes:      General: No scleral icterus.     Conjunctiva/sclera: Conjunctivae normal.   Cardiovascular:      Rate and Rhythm: Normal rate and regular rhythm.      Heart sounds: Normal heart sounds.   Pulmonary:      Effort: Pulmonary effort is normal.      Breath sounds: Normal breath sounds. No wheezing.   Abdominal:      General: Bowel sounds are normal. There is no distension.      Palpations: Abdomen is soft.      Tenderness: There is no abdominal tenderness.   Musculoskeletal:    "   Cervical back: Neck supple.      Right lower leg: No edema.      Left lower leg: No edema.   Lymphadenopathy:      Cervical: No cervical adenopathy.   Skin:     General: Skin is warm and dry.   Neurological:      General: No focal deficit present.      Mental Status: She is alert and oriented to person, place, and time.   Psychiatric:         Mood and Affect: Mood normal.         Behavior: Behavior normal.         Assessment/Plan   Problem List Items Addressed This Visit       Type 2 diabetes mellitus without complication (CMS/HCC) (Chronic)    Overview     Formatting of this note might be different from the original. 6..2015neg retinop         Relevant Orders    Comprehensive Metabolic Panel    CBC and Auto Differential    Hemoglobin A1C    Albumin , Urine Random    Hyperlipidemia LDL goal <100    Relevant Orders    Lipid Panel    Protein-calorie malnutrition, unspecified severity (CMS/HCC)    Current Assessment & Plan     Stable, will continue to monitor          Other Visit Diagnoses       Medicare annual wellness visit, subsequent    -  Primary    Encounter for screening for other disorder        Well woman exam with routine gynecological exam        Relevant Orders    Referral to Obstetrics / Gynecology    Flu vaccine need        Relevant Orders    Flu vaccine, quadrivalent, high-dose, preservative free, age 65y+ (FLUZONE) (Completed)

## 2023-10-03 NOTE — PATIENT INSTRUCTIONS
Flu shot today   Please get your second shingles vaccine at your local pharmacy   Continue with current medications.  Blood work before your next visit.  If blood work or imaging were ordered during your visit, all the nonurgent lab results will be discussed with you at your next office visit.  Please arrive 15 minutes before your appointment.   Return to office in 4-6 months or as needed

## 2023-10-18 PROBLEM — N39.46 MIXED INCONTINENCE: Status: ACTIVE | Noted: 2023-10-18

## 2023-10-18 PROBLEM — H93.13 BILATERAL TINNITUS: Status: ACTIVE | Noted: 2023-10-18

## 2023-10-18 RX ORDER — PREDNISONE 20 MG/1
60 TABLET ORAL DAILY
COMMUNITY
End: 2023-10-19 | Stop reason: ALTCHOICE

## 2023-10-18 RX ORDER — MECLIZINE HYDROCHLORIDE 25 MG/1
12.5 TABLET ORAL EVERY 8 HOURS PRN
COMMUNITY

## 2023-10-19 ENCOUNTER — OFFICE VISIT (OUTPATIENT)
Dept: OTOLARYNGOLOGY | Facility: CLINIC | Age: 68
End: 2023-10-19
Payer: MEDICARE

## 2023-10-19 VITALS — TEMPERATURE: 97.4 F | WEIGHT: 93.5 LBS | BODY MASS INDEX: 19.54 KG/M2

## 2023-10-19 DIAGNOSIS — H61.23 BILATERAL IMPACTED CERUMEN: ICD-10-CM

## 2023-10-19 DIAGNOSIS — M26.69: ICD-10-CM

## 2023-10-19 DIAGNOSIS — H93.13 BILATERAL TINNITUS: Primary | ICD-10-CM

## 2023-10-19 DIAGNOSIS — R94.120 ABNORMAL OTOACOUSTIC EMISSIONS TEST: ICD-10-CM

## 2023-10-19 PROCEDURE — 1160F RVW MEDS BY RX/DR IN RCRD: CPT | Performed by: NURSE PRACTITIONER

## 2023-10-19 PROCEDURE — 99204 OFFICE O/P NEW MOD 45 MIN: CPT | Performed by: NURSE PRACTITIONER

## 2023-10-19 PROCEDURE — 1159F MED LIST DOCD IN RCRD: CPT | Performed by: NURSE PRACTITIONER

## 2023-10-19 PROCEDURE — 1036F TOBACCO NON-USER: CPT | Performed by: NURSE PRACTITIONER

## 2023-10-19 PROCEDURE — 69210 REMOVE IMPACTED EAR WAX UNI: CPT | Performed by: NURSE PRACTITIONER

## 2023-10-19 ASSESSMENT — PATIENT HEALTH QUESTIONNAIRE - PHQ9
2. FEELING DOWN, DEPRESSED OR HOPELESS: SEVERAL DAYS
1. LITTLE INTEREST OR PLEASURE IN DOING THINGS: SEVERAL DAYS
SUM OF ALL RESPONSES TO PHQ9 QUESTIONS 1 AND 2: 2

## 2023-10-19 NOTE — PATIENT INSTRUCTIONS
DISCUSSION TINNITUS   The likely etiology of the tinnitus was reviewed. The various masking and distraction techniques were discussed including hearing aids, tinnitus retraining therapy, sounds to coverup tinnitus, biofeedback, cognitive behavioral therapy, acupuncture. All questions were answered to the patient's satisfaction.

## 2023-10-19 NOTE — PROGRESS NOTES
Subjective   Patient ID: Nazia Warner is a 68 y.o. female who presents for Tinnitus.  HPI    Both ears  It has been ongoing since 2015 since after her initial vertigo episode.  Bothers her sleep and activity  She cannot tolerate loud noises  She stays away from caffeine      The vertigo has resolved since 2015.  She still have imbalance occasional which does not have specific trigger.  She does not have ear fullness.    Denies family history of ear disease, noise exposure, ear surgery or ear trauma.  No ear drainage or pain.     She has used Flonase and Zyrtec which she states that the Zyrtec seem to help some.       Audiogram findings this is my personal interpretation showed: Normal hearing from 250 Hz to 1500 Hz sloping to very mild sensorineural hearing loss bilateral ears rising to normal from 3000 Hz to 8000 Hz.  Normal tympanogram bilateral.  Acoustic reflexes present right and left ipsilateral.  Excellent word discrimination bilateral ears.  OAE absent at 2003 1000 Hz right ear.  Absent OAE at 6008 1000 Hz left ear.    Review of Systems    All other systems have been reviewed and are negative for complaints except for those mentioned in history of present illness, past medical history and problem list       Objective   Physical Exam    CONSTITUTIONAL: No acute distress, normal facial features; No fever; no chills  VOICE: No hoarseness or other audible abnormality  RESPIRATION: Breathing comfortably, no stridor; normal breathing effort  CV: No cyanosis visible on the face and neck area  EYES:Pupils equal and round ; no erythema; conjunctiva clear; sclera white  NEURO: Alert and oriented, able to raise eyebrows symmetrical bilateral, smile with no facial droop, able to swallow  HEAD AND FACE: Symmetric facial features, no masses or lesions    Right ear examination: External ear normal.   Left ear examination: External ear normal.     NOSE: External nose midline  ORAL CAVITY: No lesions of external lips; uvula  is midline; tongue with good mobility; no gross mass in oral cavity; mucosa appears pink   NECK/LYMPH: No obvious deformity or lesions; trachea is midline  PSYCH: Alert and oriented with appropriate mood and affect     Patient ID: Nazia Warner is a 68 y.o. female.    Procedures    Cerumen removal    Consent:  The planned procedure is discussed including possible risk, benefits and alternative treatments reviewed.  Verbal consent is obtained.    Indications:Obstructed cerumen is noted affecting hearing and causing discomfort.    Procedure: The ears are examined microscopically.  Using speculum, alligator, #7, #5 suction the obstructive cerumen in both the ears were removed.    Findings: Cerumen and epithelial debris obstruction in both external auditory canals.  Inspection of tympanic membrane after cleaning showed intact with no effusion, retraction or perforation.    Post procedure: The patient tolerated the procedure well without complications       Assessment/Plan       Problem List Items Addressed This Visit       Abnormal otoacoustic emissions test    Bilateral tinnitus - Primary     Other Visit Diagnoses       Bilateral impacted cerumen        Temporomandibular joint crepitus             DISCUSSION TINNITUS   The likely etiology of the tinnitus was reviewed. The various masking and distraction techniques were discussed including hearing aids, tinnitus retraining therapy, sounds to coverup tinnitus, biofeedback, cognitive behavioral therapy, acupuncture.  Patient is not currently qualified for hearing aids.  The cerumen was successfully removed using microscope and instruments.  She may follow-up in 1 year with repeat hearing test.  Advised patient that if tinnitus worsens accompanied by dizziness or vertigo to call office.  I also recommended for patient to see her dentist for mouthguard for the TMJ crepitus.  All questions were answered to the patient's satisfaction.

## 2023-10-30 ENCOUNTER — APPOINTMENT (OUTPATIENT)
Dept: BEHAVIORAL HEALTH | Facility: CLINIC | Age: 68
End: 2023-10-30
Payer: MEDICARE

## 2023-12-05 DIAGNOSIS — E11.9 TYPE 2 DIABETES MELLITUS WITHOUT COMPLICATION, WITHOUT LONG-TERM CURRENT USE OF INSULIN (MULTI): Chronic | ICD-10-CM

## 2023-12-05 RX ORDER — DAPAGLIFLOZIN 5 MG/1
5 TABLET, FILM COATED ORAL EVERY MORNING
Qty: 90 TABLET | Refills: 1 | Status: SHIPPED | OUTPATIENT
Start: 2023-12-05 | End: 2024-04-02 | Stop reason: SDUPTHER

## 2023-12-06 ENCOUNTER — ANCILLARY PROCEDURE (OUTPATIENT)
Dept: RADIOLOGY | Facility: CLINIC | Age: 68
End: 2023-12-06
Payer: MEDICARE

## 2023-12-06 DIAGNOSIS — Z78.0 ASYMPTOMATIC MENOPAUSAL STATE: ICD-10-CM

## 2023-12-06 PROCEDURE — 77080 DXA BONE DENSITY AXIAL: CPT | Performed by: RADIOLOGY

## 2023-12-06 PROCEDURE — 77080 DXA BONE DENSITY AXIAL: CPT

## 2023-12-14 ENCOUNTER — APPOINTMENT (OUTPATIENT)
Dept: RADIOLOGY | Facility: HOSPITAL | Age: 68
End: 2023-12-14
Payer: MEDICARE

## 2023-12-14 ENCOUNTER — TELEPHONE (OUTPATIENT)
Dept: PRIMARY CARE | Facility: CLINIC | Age: 68
End: 2023-12-14

## 2023-12-14 ENCOUNTER — HOSPITAL ENCOUNTER (EMERGENCY)
Facility: HOSPITAL | Age: 68
Discharge: HOME | End: 2023-12-14
Attending: EMERGENCY MEDICINE
Payer: MEDICARE

## 2023-12-14 VITALS
WEIGHT: 90 LBS | HEART RATE: 80 BPM | TEMPERATURE: 98.2 F | BODY MASS INDEX: 18.89 KG/M2 | SYSTOLIC BLOOD PRESSURE: 126 MMHG | RESPIRATION RATE: 18 BRPM | OXYGEN SATURATION: 100 % | DIASTOLIC BLOOD PRESSURE: 80 MMHG | HEIGHT: 58 IN

## 2023-12-14 DIAGNOSIS — N20.1 URETERAL STONE: Primary | ICD-10-CM

## 2023-12-14 DIAGNOSIS — E11.9 TYPE 2 DIABETES MELLITUS WITHOUT COMPLICATION, WITHOUT LONG-TERM CURRENT USE OF INSULIN (MULTI): Chronic | ICD-10-CM

## 2023-12-14 LAB
ALBUMIN SERPL BCP-MCNC: 4.2 G/DL (ref 3.4–5)
ALP SERPL-CCNC: 40 U/L (ref 33–136)
ALT SERPL W P-5'-P-CCNC: 21 U/L (ref 7–45)
ANION GAP SERPL CALC-SCNC: 13 MMOL/L (ref 10–20)
APPEARANCE UR: CLEAR
AST SERPL W P-5'-P-CCNC: 29 U/L (ref 9–39)
BASOPHILS # BLD AUTO: 0.02 X10*3/UL (ref 0–0.1)
BASOPHILS NFR BLD AUTO: 0.3 %
BILIRUB SERPL-MCNC: 0.4 MG/DL (ref 0–1.2)
BILIRUB UR STRIP.AUTO-MCNC: NEGATIVE MG/DL
BUN SERPL-MCNC: 21 MG/DL (ref 6–23)
CALCIUM SERPL-MCNC: 8.8 MG/DL (ref 8.6–10.3)
CHLORIDE SERPL-SCNC: 101 MMOL/L (ref 98–107)
CO2 SERPL-SCNC: 22 MMOL/L (ref 21–32)
COLOR UR: YELLOW
CREAT SERPL-MCNC: 0.63 MG/DL (ref 0.5–1.05)
EOSINOPHIL # BLD AUTO: 0.04 X10*3/UL (ref 0–0.7)
EOSINOPHIL NFR BLD AUTO: 0.6 %
ERYTHROCYTE [DISTWIDTH] IN BLOOD BY AUTOMATED COUNT: 15.7 % (ref 11.5–14.5)
GFR SERPL CREATININE-BSD FRML MDRD: >90 ML/MIN/1.73M*2
GLUCOSE SERPL-MCNC: 124 MG/DL (ref 74–99)
GLUCOSE UR STRIP.AUTO-MCNC: ABNORMAL MG/DL
HCT VFR BLD AUTO: 43 % (ref 36–46)
HGB BLD-MCNC: 13.3 G/DL (ref 12–16)
IMM GRANULOCYTES # BLD AUTO: 0.03 X10*3/UL (ref 0–0.7)
IMM GRANULOCYTES NFR BLD AUTO: 0.5 % (ref 0–0.9)
KETONES UR STRIP.AUTO-MCNC: ABNORMAL MG/DL
LACTATE SERPL-SCNC: 1.3 MMOL/L (ref 0.4–2)
LEUKOCYTE ESTERASE UR QL STRIP.AUTO: NEGATIVE
LIPASE SERPL-CCNC: 54 U/L (ref 9–82)
LYMPHOCYTES # BLD AUTO: 1.01 X10*3/UL (ref 1.2–4.8)
LYMPHOCYTES NFR BLD AUTO: 16.2 %
MCH RBC QN AUTO: 25.9 PG (ref 26–34)
MCHC RBC AUTO-ENTMCNC: 30.9 G/DL (ref 32–36)
MCV RBC AUTO: 84 FL (ref 80–100)
MONOCYTES # BLD AUTO: 0.17 X10*3/UL (ref 0.1–1)
MONOCYTES NFR BLD AUTO: 2.7 %
MUCOUS THREADS #/AREA URNS AUTO: ABNORMAL /LPF
NEUTROPHILS # BLD AUTO: 4.96 X10*3/UL (ref 1.2–7.7)
NEUTROPHILS NFR BLD AUTO: 79.7 %
NITRITE UR QL STRIP.AUTO: NEGATIVE
NRBC BLD-RTO: 0 /100 WBCS (ref 0–0)
PH UR STRIP.AUTO: 6 [PH]
PLATELET # BLD AUTO: 190 X10*3/UL (ref 150–450)
POTASSIUM SERPL-SCNC: 4.8 MMOL/L (ref 3.5–5.3)
PROT SERPL-MCNC: 6.8 G/DL (ref 6.4–8.2)
PROT UR STRIP.AUTO-MCNC: NEGATIVE MG/DL
RBC # BLD AUTO: 5.14 X10*6/UL (ref 4–5.2)
RBC # UR STRIP.AUTO: ABNORMAL /UL
RBC #/AREA URNS AUTO: >20 /HPF
SODIUM SERPL-SCNC: 131 MMOL/L (ref 136–145)
SP GR UR STRIP.AUTO: 1.02
UROBILINOGEN UR STRIP.AUTO-MCNC: <2 MG/DL
WBC # BLD AUTO: 6.2 X10*3/UL (ref 4.4–11.3)
WBC #/AREA URNS AUTO: ABNORMAL /HPF

## 2023-12-14 PROCEDURE — 83690 ASSAY OF LIPASE: CPT

## 2023-12-14 PROCEDURE — 96374 THER/PROPH/DIAG INJ IV PUSH: CPT

## 2023-12-14 PROCEDURE — 99284 EMERGENCY DEPT VISIT MOD MDM: CPT | Performed by: EMERGENCY MEDICINE

## 2023-12-14 PROCEDURE — 74176 CT ABD & PELVIS W/O CONTRAST: CPT | Performed by: STUDENT IN AN ORGANIZED HEALTH CARE EDUCATION/TRAINING PROGRAM

## 2023-12-14 PROCEDURE — 81001 URINALYSIS AUTO W/SCOPE: CPT

## 2023-12-14 PROCEDURE — 80053 COMPREHEN METABOLIC PANEL: CPT

## 2023-12-14 PROCEDURE — 2500000004 HC RX 250 GENERAL PHARMACY W/ HCPCS (ALT 636 FOR OP/ED)

## 2023-12-14 PROCEDURE — 83605 ASSAY OF LACTIC ACID: CPT

## 2023-12-14 PROCEDURE — 96375 TX/PRO/DX INJ NEW DRUG ADDON: CPT

## 2023-12-14 PROCEDURE — 85025 COMPLETE CBC W/AUTO DIFF WBC: CPT

## 2023-12-14 PROCEDURE — 36415 COLL VENOUS BLD VENIPUNCTURE: CPT

## 2023-12-14 PROCEDURE — 74176 CT ABD & PELVIS W/O CONTRAST: CPT

## 2023-12-14 PROCEDURE — 96361 HYDRATE IV INFUSION ADD-ON: CPT

## 2023-12-14 RX ORDER — ONDANSETRON HYDROCHLORIDE 2 MG/ML
4 INJECTION, SOLUTION INTRAVENOUS ONCE
Status: COMPLETED | OUTPATIENT
Start: 2023-12-14 | End: 2023-12-14

## 2023-12-14 RX ORDER — OXYCODONE AND ACETAMINOPHEN 5; 325 MG/1; MG/1
1 TABLET ORAL EVERY 8 HOURS PRN
Qty: 9 TABLET | Refills: 0 | Status: SHIPPED | OUTPATIENT
Start: 2023-12-14 | End: 2023-12-17

## 2023-12-14 RX ORDER — TAMSULOSIN HYDROCHLORIDE 0.4 MG/1
0.4 CAPSULE ORAL DAILY
Qty: 30 CAPSULE | Refills: 0 | Status: SHIPPED | OUTPATIENT
Start: 2023-12-14 | End: 2024-01-13

## 2023-12-14 RX ORDER — NAPROXEN 500 MG/1
500 TABLET ORAL
Qty: 14 TABLET | Refills: 0 | Status: SHIPPED | OUTPATIENT
Start: 2023-12-14 | End: 2023-12-21

## 2023-12-14 RX ORDER — DAPAGLIFLOZIN 5 MG/1
5 TABLET, FILM COATED ORAL EVERY MORNING
Qty: 90 TABLET | Refills: 1 | OUTPATIENT
Start: 2023-12-14

## 2023-12-14 RX ORDER — KETOROLAC TROMETHAMINE 30 MG/ML
15 INJECTION, SOLUTION INTRAMUSCULAR; INTRAVENOUS ONCE
Status: COMPLETED | OUTPATIENT
Start: 2023-12-14 | End: 2023-12-14

## 2023-12-14 RX ORDER — MORPHINE SULFATE 4 MG/ML
4 INJECTION, SOLUTION INTRAMUSCULAR; INTRAVENOUS ONCE
Status: COMPLETED | OUTPATIENT
Start: 2023-12-14 | End: 2023-12-14

## 2023-12-14 RX ADMIN — MORPHINE SULFATE 4 MG: 4 INJECTION, SOLUTION INTRAMUSCULAR; INTRAVENOUS at 10:48

## 2023-12-14 RX ADMIN — KETOROLAC TROMETHAMINE 15 MG: 30 INJECTION, SOLUTION INTRAMUSCULAR at 12:33

## 2023-12-14 RX ADMIN — SODIUM CHLORIDE 1000 ML: 9 INJECTION, SOLUTION INTRAVENOUS at 10:47

## 2023-12-14 RX ADMIN — ONDANSETRON 4 MG: 2 INJECTION INTRAMUSCULAR; INTRAVENOUS at 10:48

## 2023-12-14 ASSESSMENT — PAIN SCALES - GENERAL
PAINLEVEL_OUTOF10: 7
PAINLEVEL_OUTOF10: 2
PAINLEVEL_OUTOF10: 7

## 2023-12-14 ASSESSMENT — PAIN - FUNCTIONAL ASSESSMENT: PAIN_FUNCTIONAL_ASSESSMENT: 0-10

## 2023-12-14 ASSESSMENT — COLUMBIA-SUICIDE SEVERITY RATING SCALE - C-SSRS
6. HAVE YOU EVER DONE ANYTHING, STARTED TO DO ANYTHING, OR PREPARED TO DO ANYTHING TO END YOUR LIFE?: NO
1. IN THE PAST MONTH, HAVE YOU WISHED YOU WERE DEAD OR WISHED YOU COULD GO TO SLEEP AND NOT WAKE UP?: NO
2. HAVE YOU ACTUALLY HAD ANY THOUGHTS OF KILLING YOURSELF?: NO

## 2023-12-14 NOTE — TELEPHONE ENCOUNTER
Patient contacted the office for advice on whether or not she should return to the ED. Dr. Haynes suggested the if the patient is still in extreme pain, then she should return to the ED. I informed the patient of Dr. York' advice.

## 2023-12-14 NOTE — TELEPHONE ENCOUNTER
Patient contacted the office to obtain a hospital discharge follow up appointment. She states that she will be leaving for [place] on Monday the 18th. Could we accommodate the patient or if not, then she says she will follow up with her doctor in [place] when she gets there.

## 2023-12-14 NOTE — ED PROVIDER NOTES
HPI   Chief Complaint   Patient presents with    Flank Pain     RIGHT SIDE       HPI  HISTORY OF PRESENT ILLNESS:  68 y.o. female presenting to the ED with complaint of right flank pain that woke her from sleep at 5 AM.  She reports aching and occasionally sharp pain in the right flank that radiates around into the right lower quadrant.  Comes and goes in waves of more sharp and severe pain.  She reports nausea states that she has vomited about 5 times since symptom onset.  She denies overt dysuria, but has pressure in the suprapubic area with urination.  No gross hematuria.  Denies fevers or chills.  No chest pain or shortness of breath.  No constipation or diarrhea, no melena or hematochezia.  No chest pain or shortness of breath.  No dizziness lightheadedness, no syncope.  She states that she has history of kidney stones, most recently had 1 in 2021, and this feels the same as prior episodes.  No other symptoms or complaints voiced.     PMH: DM2, glaucoma, stress  incontinence, hx kidney stones  Family history: noncontributory  Social history: non smoker, no ETOH, no illicit substances    12 point review of systems was performed and is negative unless otherwise specified in HPI.        [place] Coma Scale Score: 15                  Patient History   Past Medical History:   Diagnosis Date    Cataract 2021    Diabetes mellitus (CMS/HCC) 2005    Glaucoma 2018     Past Surgical History:   Procedure Laterality Date    WISDOM TOOTH EXTRACTION  1980     Family History   Problem Relation Name Age of Onset    Diabetes Mother Lina Warner     Cancer Father Sabas Warner     Hypertension Father Sabas Warner      Social History     Tobacco Use    Smoking status: Never    Smokeless tobacco: Never   Substance Use Topics    Alcohol use: Never    Drug use: Never       Physical Exam   ED Triage Vitals [12/14/23 0917]   Temp Heart Rate Resp BP   36.4 °C (97.6 °F) 61 16 155/85      SpO2 Temp src Heart Rate Source Patient  Position   100 % -- -- --      BP Location FiO2 (%)     -- --       Physical Exam  Constitutional:       General: She is not in acute distress.     Appearance: She is not toxic-appearing.   HENT:      Head: Normocephalic and atraumatic.      Nose: No congestion.      Mouth/Throat:      Mouth: Mucous membranes are moist.   Eyes:      General: No scleral icterus.     Extraocular Movements: Extraocular movements intact.      Pupils: Pupils are equal, round, and reactive to light.   Cardiovascular:      Rate and Rhythm: Normal rate and regular rhythm.      Pulses: Normal pulses.   Pulmonary:      Effort: Pulmonary effort is normal. No respiratory distress.      Breath sounds: Normal breath sounds.   Abdominal:      General: There is no distension.      Palpations: Abdomen is soft. There is no mass.      Tenderness: There is no abdominal tenderness. There is right CVA tenderness. There is no guarding or rebound.   Musculoskeletal:         General: Normal range of motion.      Cervical back: Normal range of motion and neck supple. No rigidity.      Right lower leg: No edema.      Left lower leg: No edema.   Skin:     General: Skin is warm and dry.      Capillary Refill: Capillary refill takes less than 2 seconds.   Neurological:      General: No focal deficit present.      Mental Status: She is alert and oriented to person, place, and time.      Gait: Gait normal.   Psychiatric:         Mood and Affect: Mood normal.         Behavior: Behavior normal.         Judgment: Judgment normal.     ED Course & MDM   Diagnoses as of 12/15/23 0653   Ureteral stone       Medical Decision Making  ED course / MDM     Summary:  Patient presented with right flank pain that radiates into the right lower quadrant, nausea, and vomiting.  History of kidney stones, this feels the same as prior episodes.  Vital signs are stable, patient is overall well-appearing.  On exam, there is right CVA tenderness, abdomen is soft and nontender, no  distention, no guarding or rigidity.  Patient is nontoxic, alert and oriented.  Lungs clear to auscultation, heart regular rate and rhythm.  No peripheral edema.  IV established, labs drawn.  Labs are notable for no leukocytosis and normal hemoglobin, normal kidney function, no significant electrode abnormalities.  Normal lipase and lactate.  Urinalysis shows blood, noninfected, no white cells, no nitrites or leukocyte esterase, no bacteria.  CT scan shows a right UVJ calculus with mild upstream hydroureteronephrosis.  Patient was given IV fluids, Zofran, morphine, and a dose of Toradol, and on reassessment, she reports complete resolution of her pain, sleeping comfortably in bed Patient case discussed with ED attending Dr. Damian, who also saw and evaluated the patient. Results and differential were discussed in detail with the patient.  Did discuss option for admission, but patient prefers discharge.  This is reasonable as she has no concomitant UTI and symptoms have resolved.  We expressed the importance outpatient follow-up with urology.  Prescription sent for Flomax, naproxen, and a short course of Percocet. Patient was given strict return precautions, understands reasons to return to the ED. Also discussed supportive care instructions. I expressed the importance of outpatient follow up with their PCP. All questions were answered, patient expressed understanding and stated that they would comply.    Patient was advised to follow up with PCP or recommended provider in 2-3 days for another evaluation and exam. I advised patient and family/friend/caregiver/guardian to return or go to closest emergency room immediately if symptoms change, get worse, new symptoms develop prior to follow up. If there is no improvement in symptoms in the next 24 hours they are advised to return for further evaluation and exam. I also explained the plan and treatment course. Patient and family/friend/caregiver/guardian is in agreement  with plan, treatment course, and follow up and states verbally that they will comply.    Impression:  1. See diagnosis    Plan: Homegoing. I discussed the differential, results, and discharge plan with the patient and family/friend/caregiver. I emphasized the importance of follow-up with the physician I referred them to in the timeframe recommended.  I explained reasons for the patient to return to the Emergency Department. They agreed that if they feel their condition is worsening or if they have any other concern they should call 911 immediately for further assistance. We also discussed medications that were prescribed including common side effects and interactions. The patient was advised to abstain from driving, operating heavy machinery, or making significant decisions while taking medications such as opiates and muscle relaxers that may impair this. I gave the patient an opportunity to ask all questions they had and answered all of them accordingly. They understand return precautions and discharge instructions. The patient and family/friend/caregiver expressed understanding verbally and that they would comply.       Disposition: Discharge    Patient seen and discussed with Dr. Damian    This note has been transcribed using voice recognition and may contain grammatical errors, misplaced words, incorrect words, incorrect phrases or other errors.     Procedure  Procedures     Aysha Joe PA-C  12/15/23 0706

## 2024-01-18 ENCOUNTER — OFFICE VISIT (OUTPATIENT)
Dept: UROLOGY | Facility: HOSPITAL | Age: 69
End: 2024-01-18
Payer: MEDICARE

## 2024-01-18 DIAGNOSIS — N20.0 STONE IN KIDNEY: Primary | ICD-10-CM

## 2024-01-18 PROCEDURE — 99214 OFFICE O/P EST MOD 30 MIN: CPT | Performed by: STUDENT IN AN ORGANIZED HEALTH CARE EDUCATION/TRAINING PROGRAM

## 2024-01-18 PROCEDURE — 99204 OFFICE O/P NEW MOD 45 MIN: CPT | Performed by: STUDENT IN AN ORGANIZED HEALTH CARE EDUCATION/TRAINING PROGRAM

## 2024-01-18 PROCEDURE — 1036F TOBACCO NON-USER: CPT | Performed by: STUDENT IN AN ORGANIZED HEALTH CARE EDUCATION/TRAINING PROGRAM

## 2024-01-18 PROCEDURE — 1160F RVW MEDS BY RX/DR IN RCRD: CPT | Performed by: STUDENT IN AN ORGANIZED HEALTH CARE EDUCATION/TRAINING PROGRAM

## 2024-01-18 PROCEDURE — 1125F AMNT PAIN NOTED PAIN PRSNT: CPT | Performed by: STUDENT IN AN ORGANIZED HEALTH CARE EDUCATION/TRAINING PROGRAM

## 2024-01-18 NOTE — PROGRESS NOTES
Subjective   Patient ID: Nazia Warner is a 68 y.o. female who presents for No chief complaint on file.      HPI  68 y.o. female who presented to the ED with complaint of right flank pain that woke her from sleep at 5 AM, diagnoses of Ureteral stone is made, urine sample on Dec 14, 2023 revealed microscopic hematuria.    CT scan Dec 14, 2023 revealed  1. Right vesicoureteric junction calculus measuring 0.4 cm (previously noted in the upper group calyx in CT scan dated 01/15/2023) with mild upstream hydroureteronephrosis.  2. Bilateral other subcentimeter nonobstructive nephrolithiasis  3. Bilateral parapelvic renal cysts .  4. Leiomyomatous uterus.    Review of Systems    All systems were reviewed. Anything negative was noted in the HPI.    Objective   Physical Exam    General: Well developed, well nourished, alert and cooperative, appears in no acute distress   Eyes: Non-injected conjunctiva, sclera clear, no proptosis   Cardiac: Extremities are warm and well perfused. No edema, cyanosis or pallor   Lungs: Breathing is easy, non-labored. Speaking in clear and complete sentences. Normal diaphragmatic movement   MSK: Ambulatory with steady gait, unassisted   Neuro: Alert and oriented to person, place, and time   Psych: Demonstrates good judgment and reason, without hallucinations, abnormal affect or abnormal behaviors   Skin: No obvious lesions, no rashes       No CVA tenderness bilaterally   No suprapubic pain or discomfort       Past Medical History:   Diagnosis Date    Cataract 2021    Diabetes mellitus (CMS/[facility]) 2005    Glaucoma 2018         Past Surgical History:   Procedure Laterality Date    WISDOM TOOTH EXTRACTION  1980           Assessment/Plan   Right ureteral stone most likely passed    68 y.o. female who presents for follow up for ureteral stone, We had a very long and extensive discussion with the patient regarding his condition.  I discussed with the patient the pathophysiology, differential diagnosis,  risk factor, management of ureteral stones.  Explained to the patient that the stone is most probably passed given their absence pain and the recent CT.  I gave the patient 3 options of management including observation which I highly encouraged given the size of the location of the stone.  Explained that they have likely chance of spontaneous passage of the stone.  We also discussed ESWL which not would be appropriate for the size of the location of stone.  We discussed at length a left ureteroscopy, laser stone fragmentation, left retrograde pyelogram, left double-J stent insertion.  We discussed in detail the risk, benefit, potential complication, adverse events including hematuria, pneumaturia, pain, stent discomfort and pain, fever, chills, infection, urosepsis. Patient presents and elect to proceed with observation.    Plan:  Renal US in 6 months                  Scribe Attestation  By signing my name below, ICaridad Scribe   attest that this documentation has been prepared under the direction and in the presence of Dr. Ten Higuera

## 2024-01-22 ENCOUNTER — DOCUMENTATION (OUTPATIENT)
Dept: PHYSICAL THERAPY | Facility: CLINIC | Age: 69
End: 2024-01-22
Payer: MEDICARE

## 2024-01-22 NOTE — PROGRESS NOTES
Physical Therapy    Discharge Summary    Name: Nazia Warner  MRN: [id number]  : 1955  Date: 24    Discharge Summary: PT    Discharge Information: Date of discharge 24    Therapy Summary:     Discharge Status: Discharge to [facility]     Rehab Discharge Reason: Failed to schedule and/or keep follow-up appointment(s)

## 2024-03-30 ASSESSMENT — ENCOUNTER SYMPTOMS
NERVOUS/ANXIOUS: 0
CONFUSION: 0
HEADACHES: 0
WEAKNESS: 0
SWEATS: 0
POLYPHAGIA: 0
DIZZINESS: 0
HUNGER: 0
SEIZURES: 0
SPEECH DIFFICULTY: 0
TREMORS: 0
POLYDIPSIA: 0
WEIGHT LOSS: 1
FATIGUE: 0
VISUAL CHANGE: 0
BLURRED VISION: 0
BLACKOUTS: 0

## 2024-04-02 ENCOUNTER — OFFICE VISIT (OUTPATIENT)
Dept: PRIMARY CARE | Facility: CLINIC | Age: 69
End: 2024-04-02
Payer: MEDICARE

## 2024-04-02 VITALS
BODY MASS INDEX: 18.97 KG/M2 | WEIGHT: 90.4 LBS | HEIGHT: 58 IN | DIASTOLIC BLOOD PRESSURE: 62 MMHG | TEMPERATURE: 97 F | OXYGEN SATURATION: 99 % | RESPIRATION RATE: 14 BRPM | SYSTOLIC BLOOD PRESSURE: 112 MMHG | HEART RATE: 60 BPM

## 2024-04-02 DIAGNOSIS — Z12.31 BREAST CANCER SCREENING BY MAMMOGRAM: ICD-10-CM

## 2024-04-02 DIAGNOSIS — Z12.12 SCREENING FOR COLORECTAL CANCER: ICD-10-CM

## 2024-04-02 DIAGNOSIS — E46 PROTEIN-CALORIE MALNUTRITION, UNSPECIFIED SEVERITY (MULTI): ICD-10-CM

## 2024-04-02 DIAGNOSIS — L65.9 HAIR LOSS: ICD-10-CM

## 2024-04-02 DIAGNOSIS — Z12.11 SCREENING FOR COLORECTAL CANCER: ICD-10-CM

## 2024-04-02 DIAGNOSIS — E11.9 TYPE 2 DIABETES MELLITUS WITHOUT COMPLICATION, WITHOUT LONG-TERM CURRENT USE OF INSULIN (MULTI): Primary | ICD-10-CM

## 2024-04-02 PROBLEM — R11.0 NAUSEA: Status: RESOLVED | Noted: 2023-04-27 | Resolved: 2024-04-02

## 2024-04-02 PROBLEM — J30.1 ALLERGIC RHINITIS DUE TO POLLEN: Status: RESOLVED | Noted: 2022-11-09 | Resolved: 2024-04-02

## 2024-04-02 PROBLEM — E78.5 HYPERLIPIDEMIA LDL GOAL <100: Status: RESOLVED | Noted: 2023-07-16 | Resolved: 2024-04-02

## 2024-04-02 PROBLEM — H04.123 DRY EYES: Status: ACTIVE | Noted: 2024-01-23

## 2024-04-02 PROBLEM — R51.9 HEADACHE: Status: RESOLVED | Noted: 2023-04-27 | Resolved: 2024-04-02

## 2024-04-02 PROCEDURE — 1036F TOBACCO NON-USER: CPT | Performed by: STUDENT IN AN ORGANIZED HEALTH CARE EDUCATION/TRAINING PROGRAM

## 2024-04-02 PROCEDURE — 3074F SYST BP LT 130 MM HG: CPT | Performed by: STUDENT IN AN ORGANIZED HEALTH CARE EDUCATION/TRAINING PROGRAM

## 2024-04-02 PROCEDURE — 99214 OFFICE O/P EST MOD 30 MIN: CPT | Performed by: STUDENT IN AN ORGANIZED HEALTH CARE EDUCATION/TRAINING PROGRAM

## 2024-04-02 PROCEDURE — 1160F RVW MEDS BY RX/DR IN RCRD: CPT | Performed by: STUDENT IN AN ORGANIZED HEALTH CARE EDUCATION/TRAINING PROGRAM

## 2024-04-02 PROCEDURE — 1159F MED LIST DOCD IN RCRD: CPT | Performed by: STUDENT IN AN ORGANIZED HEALTH CARE EDUCATION/TRAINING PROGRAM

## 2024-04-02 PROCEDURE — 3078F DIAST BP <80 MM HG: CPT | Performed by: STUDENT IN AN ORGANIZED HEALTH CARE EDUCATION/TRAINING PROGRAM

## 2024-04-02 RX ORDER — MELOXICAM 15 MG/1
15 TABLET ORAL DAILY
COMMUNITY
Start: 2024-01-26

## 2024-04-02 RX ORDER — DICLOFENAC SODIUM 10 MG/G
GEL TOPICAL
COMMUNITY
Start: 2024-03-26

## 2024-04-02 RX ORDER — DAPAGLIFLOZIN 5 MG/1
5 TABLET, FILM COATED ORAL EVERY MORNING
Qty: 90 TABLET | Refills: 2 | Status: SHIPPED | OUTPATIENT
Start: 2024-04-02

## 2024-04-02 RX ORDER — CETIRIZINE HYDROCHLORIDE 10 MG/1
TABLET ORAL
COMMUNITY

## 2024-04-02 RX ORDER — LANCETS 33 GAUGE
EACH MISCELLANEOUS
COMMUNITY
Start: 2024-03-12

## 2024-04-02 RX ORDER — LANCETS 30 GAUGE
EACH MISCELLANEOUS
COMMUNITY
Start: 2024-03-12

## 2024-04-02 ASSESSMENT — ENCOUNTER SYMPTOMS
VISUAL CHANGE: 0
FATIGUE: 0
POLYDIPSIA: 0
POLYPHAGIA: 0
BLURRED VISION: 0
WEAKNESS: 0
WEIGHT LOSS: 1

## 2024-04-02 ASSESSMENT — PATIENT HEALTH QUESTIONNAIRE - PHQ9
1. LITTLE INTEREST OR PLEASURE IN DOING THINGS: NOT AT ALL
SUM OF ALL RESPONSES TO PHQ9 QUESTIONS 1 AND 2: 0
2. FEELING DOWN, DEPRESSED OR HOPELESS: NOT AT ALL

## 2024-04-02 NOTE — PATIENT INSTRUCTIONS
Mammogram and colonoscopy ordered   Continue with current medications.  If you receive medical information from [facility], your results will be released into your online chart. This means you may view or see results before someone from our office contact you directly.  Please keep in mind that if blood work or imaging were ordered during your visit, all the nonurgent lab results will be discussed with you at your next office visit.  Please arrive 15 minutes before your appointment.   Follow-up with primary care in 4-6 months or as needed

## 2024-04-02 NOTE — PROGRESS NOTES
Subjective   Patient ID: Nazia Warner is a pleasant 69 y.o. female who presents for Follow-up (Pt is here following up for DM.).  Diabetes  She has type 2 diabetes mellitus. No MedicAlert identification noted. The initial diagnosis of diabetes was made 2006 years ago. There are no hypoglycemic associated symptoms. Associated symptoms include weight loss. Pertinent negatives for diabetes include no blurred vision, no chest pain, no fatigue, no foot paresthesias, no foot ulcerations, no polydipsia, no polyphagia, no polyuria, no visual change and no weakness. There are no hypoglycemic complications. Symptoms are stable. There are no diabetic complications. Risk factors for coronary artery disease include family history, post-menopausal and stress. Current diabetic treatment includes diet and oral agent (dual therapy). She is compliant with treatment most of the time. Her weight is stable. She is following a diabetic, generally healthy and low salt diet. Meal planning includes avoidance of concentrated sweets. She has not had a previous visit with a dietitian. She participates in exercise three times a week. She monitors blood glucose at home 1-2 x per day. Blood glucose monitoring compliance is good. Her home blood glucose trend is fluctuating dramatically. Her breakfast blood glucose is taken between 7-8 am. Her breakfast blood glucose range is generally  mg/dl. Her dinner blood glucose is taken after 8 pm. Her dinner blood glucose range is generally 110-130 mg/dl. Her overall blood glucose range is 110-130 mg/dl. She sees a podiatrist.Eye exam is current.   Last HbA1c on 3/12/2024 was 6.7%. blood work done in [place]   She has noted decreased appetite with Farxiga.    Also noted some congestion in the left ear that is associated with sinus congestion.  Due for Mammogram this month  Colonoscopy due this year. Last colonoscopy was in 2014, normal     Review of Systems   Constitutional:  Positive for weight loss.  Negative for fatigue.   Eyes:  Negative for blurred vision.   Cardiovascular:  Negative for chest pain.   Endocrine: Negative for polydipsia, polyphagia and polyuria.   Neurological:  Negative for weakness.   All other systems reviewed and are negative.      Visit Vitals  /62 (Patient Position: Sitting)   Pulse 60   Temp 36.1 °C (97 °F)   Resp 14          Objective   Physical Exam  Constitutional:       General: She is not in acute distress.     Appearance: Normal appearance.   HENT:      Head: Normocephalic and atraumatic.   Eyes:      General: No scleral icterus.     Conjunctiva/sclera: Conjunctivae normal.   Cardiovascular:      Rate and Rhythm: Normal rate and regular rhythm.      Heart sounds: Normal heart sounds.   Pulmonary:      Effort: Pulmonary effort is normal.      Breath sounds: Normal breath sounds. No wheezing.   Abdominal:      General: Bowel sounds are normal. There is no distension.      Palpations: Abdomen is soft.      Tenderness: There is no abdominal tenderness.   Musculoskeletal:      Cervical back: Neck supple.      Right lower leg: No edema.      Left lower leg: No edema.   Lymphadenopathy:      Cervical: No cervical adenopathy.   Skin:     General: Skin is warm and dry.   Neurological:      General: No focal deficit present.      Mental Status: She is alert and oriented to person, place, and time.   Psychiatric:         Mood and Affect: Mood normal.         Behavior: Behavior normal.         Assessment/Plan   Problem List Items Addressed This Visit       Type 2 diabetes mellitus without complication (CMS/HCC) - Primary (Chronic)    Relevant Medications    dapagliflozin propanediol (Farxiga) 5 mg    Protein-calorie malnutrition, unspecified severity (CMS/HCC)     Advised on trying Glucerna. Noted decreased appetite with Faxiga            Other Visit Diagnoses       Breast cancer screening by mammogram        Relevant Orders    BI mammo bilateral screening tomosynthesis    Screening for  colorectal cancer        Relevant Orders    Colonoscopy Screening; Average Risk Patient    Hair loss        Relevant Orders    Referral to Dermatology

## 2024-05-02 ENCOUNTER — APPOINTMENT (OUTPATIENT)
Dept: RADIOLOGY | Facility: CLINIC | Age: 69
End: 2024-05-02
Payer: MEDICARE

## 2024-05-16 ENCOUNTER — HOSPITAL ENCOUNTER (OUTPATIENT)
Dept: RADIOLOGY | Facility: CLINIC | Age: 69
Discharge: HOME | End: 2024-05-16
Payer: MEDICARE

## 2024-05-16 VITALS — BODY MASS INDEX: 18.14 KG/M2 | WEIGHT: 90 LBS | HEIGHT: 59 IN

## 2024-05-16 DIAGNOSIS — Z12.31 BREAST CANCER SCREENING BY MAMMOGRAM: ICD-10-CM

## 2024-05-16 PROCEDURE — 77063 BREAST TOMOSYNTHESIS BI: CPT | Performed by: RADIOLOGY

## 2024-05-16 PROCEDURE — 77063 BREAST TOMOSYNTHESIS BI: CPT

## 2024-05-16 PROCEDURE — 77067 SCR MAMMO BI INCL CAD: CPT | Performed by: RADIOLOGY

## 2024-05-29 ENCOUNTER — OFFICE VISIT (OUTPATIENT)
Dept: DERMATOLOGY | Facility: CLINIC | Age: 69
End: 2024-05-29
Payer: MEDICARE

## 2024-05-29 DIAGNOSIS — L24.9 IRRITANT CONTACT DERMATITIS, UNSPECIFIED TRIGGER: Primary | ICD-10-CM

## 2024-05-29 DIAGNOSIS — L63.9 ALOPECIA AREATA: ICD-10-CM

## 2024-05-29 PROCEDURE — 1159F MED LIST DOCD IN RCRD: CPT | Performed by: NURSE PRACTITIONER

## 2024-05-29 PROCEDURE — 1036F TOBACCO NON-USER: CPT | Performed by: NURSE PRACTITIONER

## 2024-05-29 PROCEDURE — 1160F RVW MEDS BY RX/DR IN RCRD: CPT | Performed by: NURSE PRACTITIONER

## 2024-05-29 PROCEDURE — 99203 OFFICE O/P NEW LOW 30 MIN: CPT | Performed by: NURSE PRACTITIONER

## 2024-05-29 RX ORDER — TRIAMCINOLONE ACETONIDE 1 MG/G
CREAM TOPICAL 2 TIMES DAILY PRN
Qty: 80 G | Refills: 1 | Status: SHIPPED | OUTPATIENT
Start: 2024-05-29

## 2024-05-29 RX ORDER — CLOBETASOL PROPIONATE 0.46 MG/ML
SOLUTION TOPICAL
Qty: 50 ML | Refills: 3 | Status: SHIPPED | OUTPATIENT
Start: 2024-05-29

## 2024-05-29 RX ORDER — HYDROXYZINE PAMOATE 25 MG/1
25 CAPSULE ORAL NIGHTLY PRN
Qty: 15 CAPSULE | Refills: 0 | Status: SHIPPED | OUTPATIENT
Start: 2024-05-29 | End: 2024-06-13

## 2024-05-29 NOTE — PROGRESS NOTES
Subjective     Nazia Warner is a 69 y.o. female who presents for the following: Rash.   New patient in for rash to left upper and left upper thigh for one week patient states that the areas are painful and slightly itchy, Patient has tried: hydrocortisone, deraplast , sting away  Currently using: Doxycycline 100 mg from urgent care, and oral benadryl.     Review of Systems:  No other skin or systemic complaints other than what is documented elsewhere in the note.    The following portions of the chart were reviewed this encounter and updated as appropriate:       Skin Cancer History  No skin cancer on file.    Specialty Problems    None    Past Medical History:  aNzia Warner  has a past medical history of Cataract (2021), Diabetes mellitus (Multi) (2005), and Glaucoma (2018).    Past Surgical History:  Nazia Warner  has a past surgical history that includes Dexter tooth extraction (1980).    Family History:  Patient family history includes Cancer in her father; Diabetes in her mother; Hypertension in her father; Ovarian cancer (age of onset: 80) in her cousin.    Social History:  Nazia Warner  reports that she has never smoked. She has never used smokeless tobacco. She reports that she does not drink alcohol and does not use drugs.    Allergies:  Iodinated contrast media, Iodine, Penicillins, Sulfa (sulfonamide antibiotics), Monosodium glutamate, and Amoxicillin    Current Medications / CAM's:    Current Outpatient Medications:     Accu-Chek Fastclix Lancet Drum misc, USE TO CHECK BLOOD SUGAR TWICE DAILY, Disp: , Rfl:     Accu-Chek Guide test strips strip, USE TO TEST BLOOD SUGAR TWICE DAILY, Disp: , Rfl:     azelastine (Astelin) 137 mcg (0.1 %) nasal spray, every 12 hours., Disp: , Rfl:     betamethasone dipropionate (Diprosone) 0.05 % lotion, APPLY TO THE AFFECTED AREA ON SCALP ONCE DAILY, Disp: , Rfl:     cetirizine (ZyrTEC) 10 mg tablet, 1 (one) time each day at the same time, Disp: , Rfl:     clobetasol  (Temovate) 0.05 % external solution, Apply twice daily to area on scalp, Disp: 50 mL, Rfl: 3    co-enzyme Q-10 30 mg capsule, Take 2 capsules (60 mg) by mouth once daily., Disp: , Rfl:     dapagliflozin propanediol (Farxiga) 5 mg, Take 1 tablet (5 mg) by mouth once daily in the morning., Disp: 90 tablet, Rfl: 2    diclofenac sodium (Voltaren) 1 % gel, APPLY TO FEET FOR PAIN RELIEF, Disp: , Rfl:     hydrOXYzine pamoate (Vistaril) 25 mg capsule, Take 1 capsule (25 mg) by mouth as needed at bedtime for itching for up to 15 days., Disp: 15 capsule, Rfl: 0    ibuprofen 800 mg tablet, TAKE 1 TABLET BY MOUTH THREE TIMES DAILY OR AS NEEDED FOR PAIN, Disp: , Rfl:     meclizine (Antivert) 25 mg tablet, Take 0.5 tablets (12.5 mg) by mouth every 8 hours if needed for dizziness., Disp: , Rfl:     meloxicam (Mobic) 15 mg tablet, Take 1 tablet (15 mg) by mouth once daily. Take with food., Disp: , Rfl:     metFORMIN XR (Glucophage-XR) 500 mg 24 hr tablet, Take 1 tablet (500 mg) by mouth 3 times a day. Take 1 tab in the morning and 2 tabs in the evening, Disp: , Rfl:     minoxidil (Loniten) 10 mg tablet, CRUSH 4 TABLETS INTO BETAMETHASONE LOTION AND APPLY TO THE AFFECTED AREA ON SCALP EVERY DAY. DO NOT SWALLOW, Disp: , Rfl:     montelukast (Singulair) 10 mg tablet, Take 1 tablet (10 mg) by mouth once daily at bedtime., Disp: , Rfl:     Nitro-Bid 2 % ointment, APPLY A PEA SIZED AMOUNT TO TOP OF FEET ONCE DAILY. WEAR GLOVES FOR APPLICATION., Disp: , Rfl:     OneTouch Delica Plus Lancet 30 gauge misc, USE AS DIRECTED TO CHECK BLOOD SUGAR TWICE DAILY, Disp: , Rfl:     OneTouch Verio Flex meter misc, USE AS DIRECTED TO CHECK BLOOD SUGAR TWICE DAILY, Disp: , Rfl:     oxybutynin XL (Ditropan-XL) 5 mg 24 hr tablet, Take 1 tablet (5 mg) by mouth once daily., Disp: 90 tablet, Rfl: 0    simvastatin (Zocor) 20 mg tablet, , Disp: , Rfl:     timolol (Timoptic) 0.5 % ophthalmic solution, INSTILL 1 DROP IN BOTH EYES TWICE DAILY, Disp: , Rfl:      triamcinolone (Kenalog) 0.1 % cream, Apply topically 2 times a day as needed for rash., Disp: 80 g, Rfl: 1    valACYclovir (Valtrex) 500 mg tablet, Take 2 tablets (1,000 mg) by mouth once daily as needed., Disp: , Rfl:      Objective   Well appearing patient in no apparent distress; mood and affect are within normal limits.      Assessment/Plan   1. Irritant contact dermatitis, unspecified trigger  Left Upper Arm - Posterior; Left Lower Leg - Posterior    Related Medications  triamcinolone (Kenalog) 0.1 % cream  Apply topically 2 times a day as needed for rash.    hydrOXYzine pamoate (Vistaril) 25 mg capsule  Take 1 capsule (25 mg) by mouth as needed at bedtime for itching for up to 15 days.    2. Alopecia areata  Scalp    clobetasol (Temovate) 0.05 % external solution - Scalp  Apply twice daily to area on scalp

## 2024-05-30 DIAGNOSIS — R92.8 ABNORMAL MAMMOGRAM: Primary | ICD-10-CM

## 2024-05-31 ENCOUNTER — HOSPITAL ENCOUNTER (OUTPATIENT)
Dept: RADIOLOGY | Facility: EXTERNAL LOCATION | Age: 69
Discharge: HOME | End: 2024-05-31

## 2024-05-31 ENCOUNTER — HOSPITAL ENCOUNTER (OUTPATIENT)
Dept: RADIOLOGY | Facility: CLINIC | Age: 69
Discharge: HOME | End: 2024-05-31
Payer: MEDICARE

## 2024-05-31 VITALS — WEIGHT: 89.95 LBS | HEIGHT: 59 IN | BODY MASS INDEX: 18.13 KG/M2

## 2024-05-31 DIAGNOSIS — R92.8 ABNORMAL MAMMOGRAM: ICD-10-CM

## 2024-05-31 PROCEDURE — 77061 BREAST TOMOSYNTHESIS UNI: CPT | Mod: LT

## 2024-05-31 PROCEDURE — 77065 DX MAMMO INCL CAD UNI: CPT | Mod: LEFT SIDE | Performed by: STUDENT IN AN ORGANIZED HEALTH CARE EDUCATION/TRAINING PROGRAM

## 2024-05-31 PROCEDURE — G0279 TOMOSYNTHESIS, MAMMO: HCPCS | Mod: LEFT SIDE | Performed by: STUDENT IN AN ORGANIZED HEALTH CARE EDUCATION/TRAINING PROGRAM

## 2024-06-20 ENCOUNTER — LAB (OUTPATIENT)
Dept: LAB | Facility: LAB | Age: 69
End: 2024-06-20
Payer: MEDICARE

## 2024-06-20 DIAGNOSIS — E78.5 HYPERLIPIDEMIA LDL GOAL <100: ICD-10-CM

## 2024-06-20 DIAGNOSIS — N39.46 MIXED STRESS AND URGE URINARY INCONTINENCE: ICD-10-CM

## 2024-06-20 DIAGNOSIS — N39.46 MIXED STRESS AND URGE URINARY INCONTINENCE: Primary | ICD-10-CM

## 2024-06-20 DIAGNOSIS — E11.9 TYPE 2 DIABETES MELLITUS WITHOUT COMPLICATION, WITHOUT LONG-TERM CURRENT USE OF INSULIN (MULTI): Chronic | ICD-10-CM

## 2024-06-20 LAB
ALBUMIN SERPL BCP-MCNC: 4.2 G/DL (ref 3.4–5)
ALP SERPL-CCNC: 53 U/L (ref 33–136)
ALT SERPL W P-5'-P-CCNC: 16 U/L (ref 7–45)
ANION GAP SERPL CALC-SCNC: 12 MMOL/L (ref 10–20)
AST SERPL W P-5'-P-CCNC: 15 U/L (ref 9–39)
BASOPHILS # BLD AUTO: 0.02 X10*3/UL (ref 0–0.1)
BASOPHILS NFR BLD AUTO: 0.5 %
BILIRUB SERPL-MCNC: 0.4 MG/DL (ref 0–1.2)
BUN SERPL-MCNC: 21 MG/DL (ref 6–23)
CALCIUM SERPL-MCNC: 9.5 MG/DL (ref 8.6–10.6)
CHLORIDE SERPL-SCNC: 105 MMOL/L (ref 98–107)
CHOLEST SERPL-MCNC: 140 MG/DL (ref 0–199)
CHOLESTEROL/HDL RATIO: 1.7
CO2 SERPL-SCNC: 26 MMOL/L (ref 21–32)
CREAT SERPL-MCNC: 0.7 MG/DL (ref 0.5–1.05)
CREAT UR-MCNC: 71.5 MG/DL (ref 20–320)
EGFRCR SERPLBLD CKD-EPI 2021: >90 ML/MIN/1.73M*2
EOSINOPHIL # BLD AUTO: 0.07 X10*3/UL (ref 0–0.7)
EOSINOPHIL NFR BLD AUTO: 1.7 %
ERYTHROCYTE [DISTWIDTH] IN BLOOD BY AUTOMATED COUNT: 14.9 % (ref 11.5–14.5)
EST. AVERAGE GLUCOSE BLD GHB EST-MCNC: 151 MG/DL
GLUCOSE SERPL-MCNC: 93 MG/DL (ref 74–99)
HBA1C MFR BLD: 6.9 %
HCT VFR BLD AUTO: 42.1 % (ref 36–46)
HDLC SERPL-MCNC: 82.5 MG/DL
HGB BLD-MCNC: 13.4 G/DL (ref 12–16)
IMM GRANULOCYTES # BLD AUTO: 0.01 X10*3/UL (ref 0–0.7)
IMM GRANULOCYTES NFR BLD AUTO: 0.2 % (ref 0–0.9)
LDLC SERPL CALC-MCNC: 37 MG/DL
LYMPHOCYTES # BLD AUTO: 1.56 X10*3/UL (ref 1.2–4.8)
LYMPHOCYTES NFR BLD AUTO: 38.9 %
MCH RBC QN AUTO: 26.2 PG (ref 26–34)
MCHC RBC AUTO-ENTMCNC: 31.8 G/DL (ref 32–36)
MCV RBC AUTO: 82 FL (ref 80–100)
MICROALBUMIN UR-MCNC: 17.8 MG/L
MICROALBUMIN/CREAT UR: 24.9 UG/MG CREAT
MONOCYTES # BLD AUTO: 0.28 X10*3/UL (ref 0.1–1)
MONOCYTES NFR BLD AUTO: 7 %
NEUTROPHILS # BLD AUTO: 2.07 X10*3/UL (ref 1.2–7.7)
NEUTROPHILS NFR BLD AUTO: 51.7 %
NON HDL CHOLESTEROL: 58 MG/DL (ref 0–149)
NRBC BLD-RTO: 0 /100 WBCS (ref 0–0)
PLATELET # BLD AUTO: 214 X10*3/UL (ref 150–450)
POTASSIUM SERPL-SCNC: 4.4 MMOL/L (ref 3.5–5.3)
PROT SERPL-MCNC: 6.8 G/DL (ref 6.4–8.2)
RBC # BLD AUTO: 5.11 X10*6/UL (ref 4–5.2)
SODIUM SERPL-SCNC: 139 MMOL/L (ref 136–145)
TRIGL SERPL-MCNC: 102 MG/DL (ref 0–149)
VLDL: 20 MG/DL (ref 0–40)
WBC # BLD AUTO: 4 X10*3/UL (ref 4.4–11.3)

## 2024-06-20 PROCEDURE — 85025 COMPLETE CBC W/AUTO DIFF WBC: CPT

## 2024-06-20 PROCEDURE — 82043 UR ALBUMIN QUANTITATIVE: CPT

## 2024-06-20 PROCEDURE — 80061 LIPID PANEL: CPT

## 2024-06-20 PROCEDURE — 82570 ASSAY OF URINE CREATININE: CPT

## 2024-06-20 PROCEDURE — 87086 URINE CULTURE/COLONY COUNT: CPT

## 2024-06-20 PROCEDURE — 36415 COLL VENOUS BLD VENIPUNCTURE: CPT

## 2024-06-20 PROCEDURE — 80053 COMPREHEN METABOLIC PANEL: CPT

## 2024-06-20 PROCEDURE — 83036 HEMOGLOBIN GLYCOSYLATED A1C: CPT

## 2024-06-22 LAB — BACTERIA UR CULT: NO GROWTH

## 2024-07-09 ENCOUNTER — HOSPITAL ENCOUNTER (OUTPATIENT)
Dept: RADIOLOGY | Facility: HOSPITAL | Age: 69
Discharge: HOME | End: 2024-07-09
Payer: MEDICARE

## 2024-07-09 DIAGNOSIS — N20.0 STONE IN KIDNEY: ICD-10-CM

## 2024-07-09 PROCEDURE — 76770 US EXAM ABDO BACK WALL COMP: CPT

## 2024-07-09 PROCEDURE — 76770 US EXAM ABDO BACK WALL COMP: CPT | Performed by: RADIOLOGY

## 2024-07-11 ENCOUNTER — APPOINTMENT (OUTPATIENT)
Dept: PRIMARY CARE | Facility: CLINIC | Age: 69
End: 2024-07-11
Payer: MEDICARE

## 2024-07-11 VITALS
HEIGHT: 58 IN | RESPIRATION RATE: 16 BRPM | DIASTOLIC BLOOD PRESSURE: 62 MMHG | WEIGHT: 91.49 LBS | TEMPERATURE: 97.2 F | BODY MASS INDEX: 19.2 KG/M2 | HEART RATE: 57 BPM | OXYGEN SATURATION: 95 % | SYSTOLIC BLOOD PRESSURE: 139 MMHG

## 2024-07-11 DIAGNOSIS — N39.46 MIXED STRESS AND URGE URINARY INCONTINENCE: ICD-10-CM

## 2024-07-11 DIAGNOSIS — E11.9 TYPE 2 DIABETES MELLITUS WITHOUT COMPLICATION, WITHOUT LONG-TERM CURRENT USE OF INSULIN (MULTI): Primary | Chronic | ICD-10-CM

## 2024-07-11 DIAGNOSIS — E78.00 HYPERCHOLESTEROLEMIA: ICD-10-CM

## 2024-07-11 PROCEDURE — 1160F RVW MEDS BY RX/DR IN RCRD: CPT | Performed by: INTERNAL MEDICINE

## 2024-07-11 PROCEDURE — 3075F SYST BP GE 130 - 139MM HG: CPT | Performed by: INTERNAL MEDICINE

## 2024-07-11 PROCEDURE — 3044F HG A1C LEVEL LT 7.0%: CPT | Performed by: INTERNAL MEDICINE

## 2024-07-11 PROCEDURE — 1126F AMNT PAIN NOTED NONE PRSNT: CPT | Performed by: INTERNAL MEDICINE

## 2024-07-11 PROCEDURE — 99214 OFFICE O/P EST MOD 30 MIN: CPT | Performed by: INTERNAL MEDICINE

## 2024-07-11 PROCEDURE — 3078F DIAST BP <80 MM HG: CPT | Performed by: INTERNAL MEDICINE

## 2024-07-11 PROCEDURE — 1159F MED LIST DOCD IN RCRD: CPT | Performed by: INTERNAL MEDICINE

## 2024-07-11 PROCEDURE — 3048F LDL-C <100 MG/DL: CPT | Performed by: INTERNAL MEDICINE

## 2024-07-11 PROCEDURE — 1036F TOBACCO NON-USER: CPT | Performed by: INTERNAL MEDICINE

## 2024-07-11 PROCEDURE — 3061F NEG MICROALBUMINURIA REV: CPT | Performed by: INTERNAL MEDICINE

## 2024-07-11 RX ORDER — SOLIFENACIN SUCCINATE 5 MG/1
5 TABLET, FILM COATED ORAL DAILY
COMMUNITY

## 2024-07-11 ASSESSMENT — ENCOUNTER SYMPTOMS
PHOTOPHOBIA: 0
SEIZURES: 0
BACK PAIN: 0
DEPRESSION: 0
TREMORS: 0
HEMATURIA: 0
ADENOPATHY: 0
POLYPHAGIA: 0
DIZZINESS: 0
FLANK PAIN: 0
COUGH: 0
LOSS OF SENSATION IN FEET: 0
DYSURIA: 0
BRUISES/BLEEDS EASILY: 0
OCCASIONAL FEELINGS OF UNSTEADINESS: 0
CHILLS: 0
HEADACHES: 0
DIAPHORESIS: 0
WEAKNESS: 0
FATIGUE: 0
ARTHRALGIAS: 0
APPETITE CHANGE: 0
PALPITATIONS: 0
NUMBNESS: 0
SPEECH DIFFICULTY: 0
STRIDOR: 0

## 2024-07-11 ASSESSMENT — PATIENT HEALTH QUESTIONNAIRE - PHQ9
2. FEELING DOWN, DEPRESSED OR HOPELESS: NOT AT ALL
SUM OF ALL RESPONSES TO PHQ9 QUESTIONS 1 AND 2: 0
1. LITTLE INTEREST OR PLEASURE IN DOING THINGS: NOT AT ALL

## 2024-07-11 ASSESSMENT — PAIN SCALES - GENERAL: PAINLEVEL: 0-NO PAIN

## 2024-07-11 NOTE — ASSESSMENT & PLAN NOTE
Continue same medications and follow diabetic diet.  Avoid rice potatoes pasta sweets pop sweet drinks.  Last hemoglobin A1c 6.9 in June this year.    Check hemoglobin A1c before next visit.

## 2024-07-11 NOTE — ASSESSMENT & PLAN NOTE
Hypercholesterolemia: reviewed lipid profile.   Educate low cholesterol diet , avoid fast foods , processed meats and fried foods, red meat.  Increase physical activity.  Keep a low carb diet.  Last LDL 37.

## 2024-07-11 NOTE — PROGRESS NOTES
"Subjective   Patient ID: Nazia Warner is a 69 y.o. female who presents for New Patient Visit.    New patient comes to establish with new primary care physician.  She has diabetes type 2 controlled, history of nephrolithiasis, glaucoma, overactive bladder, seasonal allergies.  She has had recent blood work last month and results were reviewed.  Overall she feels well has no complaints today.  She also lives in [place] few months a year mostly in winter.         Review of Systems   Constitutional:  Negative for appetite change, chills, diaphoresis and fatigue.   HENT:  Negative for congestion, ear discharge, hearing loss, mouth sores and postnasal drip.    Eyes:  Negative for photophobia and visual disturbance.   Respiratory:  Negative for cough and stridor.    Cardiovascular:  Negative for palpitations and leg swelling.   Endocrine: Negative for cold intolerance, heat intolerance, polyphagia and polyuria.   Genitourinary:  Negative for decreased urine volume, dysuria, enuresis, flank pain and hematuria.   Musculoskeletal:  Negative for arthralgias, back pain and gait problem.   Allergic/Immunologic: Negative for food allergies and immunocompromised state.   Neurological:  Negative for dizziness, tremors, seizures, syncope, speech difficulty, weakness, numbness and headaches.   Hematological:  Negative for adenopathy. Does not bruise/bleed easily.       Objective   /62 (BP Location: Left arm, Patient Position: Sitting)   Pulse 57   Temp 36.2 °C (97.2 °F) (Temporal)   Resp 16   Ht 1.476 m (4' 10.11\")   Wt (!) 41.5 kg (91 lb 7.9 oz)   SpO2 95%   BMI 19.05 kg/m²     Physical Exam  Constitutional:       Appearance: Normal appearance.   HENT:      Head: Normocephalic and atraumatic.      Right Ear: External ear normal.      Left Ear: External ear normal.      Mouth/Throat:      Mouth: Mucous membranes are moist.      Pharynx: Oropharynx is clear.   Neck:      Vascular: No carotid bruit.   Cardiovascular:      " Rate and Rhythm: Normal rate and regular rhythm.      Heart sounds: No murmur heard.     No gallop.   Pulmonary:      Effort: Pulmonary effort is normal. No respiratory distress.      Breath sounds: No wheezing or rales.   Abdominal:      General: Abdomen is flat.      Palpations: Abdomen is soft.      Hernia: No hernia is present.   Musculoskeletal:         General: No swelling, tenderness, deformity or signs of injury.      Cervical back: No rigidity or tenderness.      Right lower leg: No edema.   Lymphadenopathy:      Cervical: No cervical adenopathy.   Skin:     Coloration: Skin is not jaundiced or pale.      Findings: No bruising, erythema, lesion or rash.   Neurological:      General: No focal deficit present.      Mental Status: She is oriented to person, place, and time.      Motor: No weakness.      Coordination: Coordination normal.   Psychiatric:         Mood and Affect: Mood normal.         Behavior: Behavior normal.         Assessment/Plan   Problem List Items Addressed This Visit             ICD-10-CM    Incontinence R32     Stable with Vesicare.         Type 2 diabetes mellitus without complication (Multi) - Primary (Chronic) E11.9     Continue same medications and follow diabetic diet.  Avoid rice potatoes pasta sweets pop sweet drinks.  Last hemoglobin A1c 6.9 in June this year.    Check hemoglobin A1c before next visit.            Relevant Orders    CBC and Auto Differential    Comprehensive Metabolic Panel    Hemoglobin A1C    Hypercholesterolemia E78.00     Hypercholesterolemia: reviewed lipid profile.   Educate low cholesterol diet , avoid fast foods , processed meats and fried foods, red meat.  Increase physical activity.  Keep a low carb diet.  Last LDL 37.             Relevant Orders    Lipid Panel

## 2024-07-16 ENCOUNTER — OFFICE VISIT (OUTPATIENT)
Dept: UROLOGY | Facility: HOSPITAL | Age: 69
End: 2024-07-16
Payer: MEDICARE

## 2024-07-16 DIAGNOSIS — N20.0 LEFT RENAL STONE: Primary | ICD-10-CM

## 2024-07-16 PROCEDURE — 3044F HG A1C LEVEL LT 7.0%: CPT | Performed by: STUDENT IN AN ORGANIZED HEALTH CARE EDUCATION/TRAINING PROGRAM

## 2024-07-16 PROCEDURE — 3048F LDL-C <100 MG/DL: CPT | Performed by: STUDENT IN AN ORGANIZED HEALTH CARE EDUCATION/TRAINING PROGRAM

## 2024-07-16 PROCEDURE — 1159F MED LIST DOCD IN RCRD: CPT | Performed by: STUDENT IN AN ORGANIZED HEALTH CARE EDUCATION/TRAINING PROGRAM

## 2024-07-16 PROCEDURE — 99214 OFFICE O/P EST MOD 30 MIN: CPT | Performed by: STUDENT IN AN ORGANIZED HEALTH CARE EDUCATION/TRAINING PROGRAM

## 2024-07-16 PROCEDURE — 3061F NEG MICROALBUMINURIA REV: CPT | Performed by: STUDENT IN AN ORGANIZED HEALTH CARE EDUCATION/TRAINING PROGRAM

## 2024-07-16 NOTE — PROGRESS NOTES
Subjective   Patient ID: Nazia Warner is a 69 y.o. female    HPI  69 y.o. female who presents with a history of kidney stones, with the most recent stone being 0.8 centimeters on the left side. The stone is currently inside the kidney, not causing pain, pressure, or infection. The patient has a history of passing kidney stones once a year, typically due to inadequate hydration. The patient is considering options for management, including observation or shock wave lithotripsy (SWL). The patient prefers to avoid the pain associated with passing stones and is leaning towards scheduling SWL. A renal ultrasound and an x-ray of the kidneys will be performed to assess the stone's visibility and size.        The most recent Renal US, conducted on 7/9/2024, revealed:  Parapelvic renal cysts bilaterally nonobstructing renal stones  bilaterally.       Review of Systems    All systems were reviewed. Anything negative was noted in the HPI.    Objective   Physical Exam    General: Well developed, well nourished, alert and cooperative, appears in no acute distress   Eyes: Non-injected conjunctiva, sclera clear, no proptosis   Cardiac: Extremities are warm and well perfused. No edema, cyanosis or pallor   Lungs: Breathing is easy, non-labored. Speaking in clear and complete sentences. Normal diaphragmatic movement   MSK: Ambulatory with steady gait, unassisted   Neuro: Alert and oriented to person, place, and time   Psych: Demonstrates good judgment and reason, without hallucinations, abnormal affect or abnormal behaviors   Skin: No obvious lesions, no rashes       No CVA tenderness bilaterally   No suprapubic pain or discomfort       Past Medical History:   Diagnosis Date    Cataract 2021    Diabetes mellitus (Multi) 2005    GERD (gastroesophageal reflux disease) 2015    Glaucoma 2018         Past Surgical History:   Procedure Laterality Date    WISDOM TOOTH EXTRACTION  1980           Assessment/Plan   Non-obstructing  nephrolithiasis    69 y.o. female who presents for the above condition, We had a very long and extensive discussion with the patient regarding his condition.  I discussed with the patient the pathophysiology, differential diagnosis, risk factor, management of ureteral stones.  Explained to the patient that the stone is most probably still present given their persistent pain and the recent CT.  I gave the patient 3 options of management including observation which I discouraged given their episode of fever, the size of the location of the stone.  Explained that they have less likely chance of spontaneous passage of the stone.  We also discussed ESWL which would be appropriate for the size of the location of stone.  We discussed at length a left ureteroscopy, laser stone fragmentation, left retrograde pyelogram, left double-J stent insertion.  We discussed in detail the risk, benefit, potential complication, adverse events including hematuria, pneumaturia, pain, stent discomfort and pain, fever, chills, infection, urosepsis, I explained to the patient that most likely they need a second procedure at the first wound will be probably only a stent placement. I explained that the second procedure would be the actual laser stone fragmentation and exchange of their stent. Patient presents and elect to proceed.      Plan:  - KUB X-Ray  - Follow up in 4 months        7/16/2024    Paige Attestation  By signing my name below, I, Paige Curiel   attest that this documentation has been prepared under the direction and in the presence of Dr. Ten Higuera

## 2024-09-26 ENCOUNTER — HOSPITAL ENCOUNTER (EMERGENCY)
Facility: HOSPITAL | Age: 69
Discharge: HOME | End: 2024-09-26
Attending: EMERGENCY MEDICINE
Payer: MEDICARE

## 2024-09-26 ENCOUNTER — APPOINTMENT (OUTPATIENT)
Dept: RADIOLOGY | Facility: HOSPITAL | Age: 69
End: 2024-09-26
Payer: MEDICARE

## 2024-09-26 VITALS
TEMPERATURE: 97.7 F | HEIGHT: 60 IN | RESPIRATION RATE: 16 BRPM | BODY MASS INDEX: 18.65 KG/M2 | OXYGEN SATURATION: 97 % | SYSTOLIC BLOOD PRESSURE: 144 MMHG | WEIGHT: 95 LBS | HEART RATE: 77 BPM | DIASTOLIC BLOOD PRESSURE: 97 MMHG

## 2024-09-26 DIAGNOSIS — D32.9 MENINGIOMA (MULTI): ICD-10-CM

## 2024-09-26 DIAGNOSIS — S09.90XA HEAD INJURY, INITIAL ENCOUNTER: Primary | ICD-10-CM

## 2024-09-26 PROCEDURE — 70450 CT HEAD/BRAIN W/O DYE: CPT | Performed by: RADIOLOGY

## 2024-09-26 PROCEDURE — 70450 CT HEAD/BRAIN W/O DYE: CPT

## 2024-09-26 PROCEDURE — 99284 EMERGENCY DEPT VISIT MOD MDM: CPT | Mod: 25 | Performed by: EMERGENCY MEDICINE

## 2024-09-26 RX ORDER — ACETAMINOPHEN 325 MG/1
975 TABLET ORAL ONCE
Status: COMPLETED | OUTPATIENT
Start: 2024-09-26 | End: 2024-09-26

## 2024-09-26 RX ADMIN — ACETAMINOPHEN 975 MG: 325 TABLET ORAL at 12:54

## 2024-09-26 ASSESSMENT — PAIN SCALES - GENERAL
PAINLEVEL_OUTOF10: 2
PAINLEVEL_OUTOF10: 3

## 2024-09-26 ASSESSMENT — PAIN DESCRIPTION - LOCATION
LOCATION: HEAD
LOCATION: HEAD

## 2024-09-26 ASSESSMENT — PAIN - FUNCTIONAL ASSESSMENT: PAIN_FUNCTIONAL_ASSESSMENT: 0-10

## 2024-09-26 ASSESSMENT — PAIN DESCRIPTION - PAIN TYPE: TYPE: ACUTE PAIN

## 2024-09-26 ASSESSMENT — PAIN SCALES - PAIN ASSESSMENT IN ADVANCED DEMENTIA (PAINAD): TOTALSCORE: MEDICATION (SEE MAR)

## 2024-09-26 NOTE — ED PROVIDER NOTES
"HPI   Chief Complaint   Patient presents with    Head Injury     Pit hit head on door while  visiting her  in hospital. \"felt stunned\" but no loc, no thinners, no lac/abrasion/bruise. Ambulated with steady gait to bed, denies nausea/vomiting       HPI: []  69-year-old female with a history of hypertension, diabetes comes in with head injury.  She was visiting her  in the hospital and excellently hit her head on the door she felt stunned with no LOC no neck pain and she was sent to the ED for evaluation.  Currently has a bad headache.  No double vision blurry and loss of urine no blood thinners.  No neck pain.  No paresthesias.  No weakness.  No LOC.  No incontinence no no seizures.  Patient denies any chest pain pressure having fever chills nausea vomit diarrhea cough congestion syncope or near syncope.    Past history: Hypertension, diabetes  Social: Pain denies current tobacco alcohol or drug abuse.  REVIEW OF SYSTEMS:    GENERAL.: No weight loss, fatigue, anorexia, insomnia, fever.  Positive headache    EYES: No vision loss, double vision, drainage, eye pain.    ENT: No pharyngitis, dry mouth.    CARDIOPULMONARY: No chest pain, palpitations, syncope, near syncope. No shortness of breath, cough, hemoptysis.    GI: No abdominal pain, change in bowel habits, melena, hematemesis, hematochezia, nausea, vomiting, diarrhea.    : No discharge, dysuria, frequency, urgency, hematuria.    MS: No limb pain, joint pain, joint swelling.    SKIN: No rashes.    PSYCH: No depression, anxiety, suicidality, homicidality.    Review of systems is otherwise negative unless stated above or in history of present illness.  Social history, family history, allergies reviewed.  PHYSICAL EXAM:    GENERAL: Vitals noted, no distress. Alert and oriented  x 3. Non-toxic.    Eyes: Pupils equal round and reactive to vomiting EOMs are intact  EENT: TMs clear. Posterior oropharynx unremarkable. No meningismus. No LAD.  Negative " mastoid tenderness no hemotympanum    NECK: Supple. Nontender. No midline tenderness.     CARDIAC: Regular, rate, rhythm. No murmurs rubs or gallops. No JVD    PULMONARY: Lungs clear bilaterally with good aeration. No wheezes rales or rhonchi. No respiratory distress.     ABDOMEN: Soft, nonsurgical. Nontender. No peritoneal signs. Normoactive bowel sounds. No pulsatile masses.     EXTREMITIES: No peripheral edema. Negative Homans bilaterally, no cords.  2+ bounding pulses well-perfused    SKIN: No rash. Intact.     NEURO: No focal neurologic deficits, NIH score of 0. Cranial nerves normal as tested from II through XII.     MEDICAL DECISION MAKING:  CT head was done which shows no traumatic injury did show a chronic appearing 1 cm meningioma    Treatment in the ED: Patient was given Tylenol    ED course: Patient remains asymptomatic GCS 15 patient made aware of the abnormal Finding of the Meningioma    Impression: Closed head injury  Plan/MDM: 69-year-old female no blood thinners comes in with a head injury currently she is neurologic intact, GCS 15, her primary signature was negative, low concern for traumatic injury to the chest abdomen pelvis or C-spine or T-spine or L-spine, patient made aware of the abnormal CAT scan finding of the meningioma advised outpatient follow-up with primary doctor and possibly neurosurgery with strict return precaution.                Patient History   Past Medical History:   Diagnosis Date    Cataract 2021    Diabetes mellitus (Multi) 2005    GERD (gastroesophageal reflux disease) 2015    Glaucoma 2018     Past Surgical History:   Procedure Laterality Date    WISDOM TOOTH EXTRACTION  1980     Family History   Problem Relation Name Age of Onset    Diabetes Mother Lina Warner     Hypertension Mother Lina Warner     Cancer Father Sabas Warner     Hypertension Father Sabas Warner     Heart disease Father Sabas Warner     Ovarian cancer Cousin 2nd cousin 80    Diabetes  Maternal Grandfather Maricruz Drummond     Diabetes Sister Sister      Social History     Tobacco Use    Smoking status: Never    Smokeless tobacco: Never   Substance Use Topics    Alcohol use: Never    Drug use: Never       Physical Exam   ED Triage Vitals [09/26/24 1214]   Temperature Heart Rate Respirations BP   36.5 °C (97.7 °F) 77 16 (!) 144/97      Pulse Ox Temp Source Heart Rate Source Patient Position   97 % Tympanic Monitor Sitting      BP Location FiO2 (%)     Left arm --       Physical Exam      ED Course & MDM   ED Course as of 09/26/24 1532   Thu Sep 26, 2024   1511 CT head negative for traumatic injury has a stable unchanged meningioma patient made aware GCS 15 will be discharged home supportive care recommended with close outpatient follow-up. [MT]      ED Course User Index  [MT] Goldie Damian MD         Diagnoses as of 09/26/24 1532   Head injury, initial encounter   Meningioma (Multi)                 No data recorded     [place] Coma Scale Score: 15 (09/26/24 1215 : Linda Evans RN)                           Medical Decision Making      Procedure  Procedures     Goldie Damian MD  09/26/24 1534

## 2024-10-10 ENCOUNTER — LAB (OUTPATIENT)
Dept: LAB | Facility: LAB | Age: 69
End: 2024-10-10
Payer: MEDICARE

## 2024-10-10 DIAGNOSIS — E11.9 TYPE 2 DIABETES MELLITUS WITHOUT COMPLICATION, WITHOUT LONG-TERM CURRENT USE OF INSULIN (MULTI): Chronic | ICD-10-CM

## 2024-10-10 DIAGNOSIS — E78.00 HYPERCHOLESTEROLEMIA: ICD-10-CM

## 2024-10-10 LAB
ALBUMIN SERPL BCP-MCNC: 4.6 G/DL (ref 3.4–5)
ALP SERPL-CCNC: 56 U/L (ref 33–136)
ALT SERPL W P-5'-P-CCNC: 21 U/L (ref 7–45)
ANION GAP SERPL CALC-SCNC: 13 MMOL/L (ref 10–20)
AST SERPL W P-5'-P-CCNC: 20 U/L (ref 9–39)
BASOPHILS # BLD AUTO: 0.02 X10*3/UL (ref 0–0.1)
BASOPHILS NFR BLD AUTO: 0.5 %
BILIRUB SERPL-MCNC: 0.5 MG/DL (ref 0–1.2)
BUN SERPL-MCNC: 15 MG/DL (ref 6–23)
CALCIUM SERPL-MCNC: 10.2 MG/DL (ref 8.6–10.6)
CHLORIDE SERPL-SCNC: 103 MMOL/L (ref 98–107)
CHOLEST SERPL-MCNC: 156 MG/DL (ref 0–199)
CHOLESTEROL/HDL RATIO: 1.9
CO2 SERPL-SCNC: 28 MMOL/L (ref 21–32)
CREAT SERPL-MCNC: 0.77 MG/DL (ref 0.5–1.05)
EGFRCR SERPLBLD CKD-EPI 2021: 83 ML/MIN/1.73M*2
EOSINOPHIL # BLD AUTO: 0.08 X10*3/UL (ref 0–0.7)
EOSINOPHIL NFR BLD AUTO: 1.9 %
ERYTHROCYTE [DISTWIDTH] IN BLOOD BY AUTOMATED COUNT: 15.7 % (ref 11.5–14.5)
EST. AVERAGE GLUCOSE BLD GHB EST-MCNC: 151 MG/DL
GLUCOSE SERPL-MCNC: 127 MG/DL (ref 74–99)
HBA1C MFR BLD: 6.9 %
HCT VFR BLD AUTO: 46.5 % (ref 36–46)
HDLC SERPL-MCNC: 84.1 MG/DL
HGB BLD-MCNC: 14.7 G/DL (ref 12–16)
IMM GRANULOCYTES # BLD AUTO: 0.01 X10*3/UL (ref 0–0.7)
IMM GRANULOCYTES NFR BLD AUTO: 0.2 % (ref 0–0.9)
LDLC SERPL CALC-MCNC: 57 MG/DL
LYMPHOCYTES # BLD AUTO: 1.78 X10*3/UL (ref 1.2–4.8)
LYMPHOCYTES NFR BLD AUTO: 41.2 %
MCH RBC QN AUTO: 26.2 PG (ref 26–34)
MCHC RBC AUTO-ENTMCNC: 31.6 G/DL (ref 32–36)
MCV RBC AUTO: 83 FL (ref 80–100)
MONOCYTES # BLD AUTO: 0.25 X10*3/UL (ref 0.1–1)
MONOCYTES NFR BLD AUTO: 5.8 %
NEUTROPHILS # BLD AUTO: 2.18 X10*3/UL (ref 1.2–7.7)
NEUTROPHILS NFR BLD AUTO: 50.4 %
NON HDL CHOLESTEROL: 72 MG/DL (ref 0–149)
NRBC BLD-RTO: 0 /100 WBCS (ref 0–0)
PLATELET # BLD AUTO: 201 X10*3/UL (ref 150–450)
POTASSIUM SERPL-SCNC: 4.6 MMOL/L (ref 3.5–5.3)
PROT SERPL-MCNC: 7.5 G/DL (ref 6.4–8.2)
RBC # BLD AUTO: 5.61 X10*6/UL (ref 4–5.2)
SODIUM SERPL-SCNC: 139 MMOL/L (ref 136–145)
TRIGL SERPL-MCNC: 77 MG/DL (ref 0–149)
VLDL: 15 MG/DL (ref 0–40)
WBC # BLD AUTO: 4.3 X10*3/UL (ref 4.4–11.3)

## 2024-10-10 PROCEDURE — 83036 HEMOGLOBIN GLYCOSYLATED A1C: CPT

## 2024-10-10 PROCEDURE — 80061 LIPID PANEL: CPT

## 2024-10-10 PROCEDURE — 85025 COMPLETE CBC W/AUTO DIFF WBC: CPT

## 2024-10-10 PROCEDURE — 36415 COLL VENOUS BLD VENIPUNCTURE: CPT

## 2024-10-10 PROCEDURE — 80053 COMPREHEN METABOLIC PANEL: CPT

## 2024-10-15 ENCOUNTER — HOSPITAL ENCOUNTER (OUTPATIENT)
Dept: RADIOLOGY | Facility: HOSPITAL | Age: 69
Discharge: HOME | End: 2024-10-15
Payer: MEDICARE

## 2024-10-15 DIAGNOSIS — N20.0 LEFT RENAL STONE: ICD-10-CM

## 2024-10-15 PROCEDURE — 74019 RADEX ABDOMEN 2 VIEWS: CPT

## 2024-10-15 PROCEDURE — 74022 RADEX COMPL AQT ABD SERIES: CPT | Performed by: RADIOLOGY

## 2024-10-16 ENCOUNTER — APPOINTMENT (OUTPATIENT)
Dept: PRIMARY CARE | Facility: CLINIC | Age: 69
End: 2024-10-16
Payer: MEDICARE

## 2024-10-16 ENCOUNTER — LAB (OUTPATIENT)
Dept: LAB | Facility: LAB | Age: 69
End: 2024-10-16

## 2024-10-16 VITALS
SYSTOLIC BLOOD PRESSURE: 124 MMHG | BODY MASS INDEX: 18.16 KG/M2 | WEIGHT: 93 LBS | DIASTOLIC BLOOD PRESSURE: 76 MMHG | OXYGEN SATURATION: 97 % | HEART RATE: 66 BPM

## 2024-10-16 DIAGNOSIS — Z00.00 HEALTH CARE MAINTENANCE: Primary | ICD-10-CM

## 2024-10-16 DIAGNOSIS — D32.9 MENINGIOMA (MULTI): ICD-10-CM

## 2024-10-16 DIAGNOSIS — S09.90XA HEAD INJURY, INITIAL ENCOUNTER: ICD-10-CM

## 2024-10-16 LAB
CREAT UR-MCNC: 56.1 MG/DL (ref 20–320)
MICROALBUMIN UR-MCNC: <7 MG/L
MICROALBUMIN/CREAT UR: NORMAL MG/G{CREAT}

## 2024-10-16 PROCEDURE — 1126F AMNT PAIN NOTED NONE PRSNT: CPT | Performed by: STUDENT IN AN ORGANIZED HEALTH CARE EDUCATION/TRAINING PROGRAM

## 2024-10-16 PROCEDURE — 3074F SYST BP LT 130 MM HG: CPT | Performed by: STUDENT IN AN ORGANIZED HEALTH CARE EDUCATION/TRAINING PROGRAM

## 2024-10-16 PROCEDURE — 1123F ACP DISCUSS/DSCN MKR DOCD: CPT | Performed by: STUDENT IN AN ORGANIZED HEALTH CARE EDUCATION/TRAINING PROGRAM

## 2024-10-16 PROCEDURE — 90662 IIV NO PRSV INCREASED AG IM: CPT | Performed by: STUDENT IN AN ORGANIZED HEALTH CARE EDUCATION/TRAINING PROGRAM

## 2024-10-16 PROCEDURE — 99213 OFFICE O/P EST LOW 20 MIN: CPT | Performed by: STUDENT IN AN ORGANIZED HEALTH CARE EDUCATION/TRAINING PROGRAM

## 2024-10-16 PROCEDURE — 3062F POS MACROALBUMINURIA REV: CPT | Performed by: STUDENT IN AN ORGANIZED HEALTH CARE EDUCATION/TRAINING PROGRAM

## 2024-10-16 PROCEDURE — 82043 UR ALBUMIN QUANTITATIVE: CPT

## 2024-10-16 PROCEDURE — 3048F LDL-C <100 MG/DL: CPT | Performed by: STUDENT IN AN ORGANIZED HEALTH CARE EDUCATION/TRAINING PROGRAM

## 2024-10-16 PROCEDURE — 3078F DIAST BP <80 MM HG: CPT | Performed by: STUDENT IN AN ORGANIZED HEALTH CARE EDUCATION/TRAINING PROGRAM

## 2024-10-16 PROCEDURE — 82570 ASSAY OF URINE CREATININE: CPT

## 2024-10-16 PROCEDURE — 90471 IMMUNIZATION ADMIN: CPT | Performed by: STUDENT IN AN ORGANIZED HEALTH CARE EDUCATION/TRAINING PROGRAM

## 2024-10-16 PROCEDURE — 3044F HG A1C LEVEL LT 7.0%: CPT | Performed by: STUDENT IN AN ORGANIZED HEALTH CARE EDUCATION/TRAINING PROGRAM

## 2024-10-16 PROCEDURE — 1159F MED LIST DOCD IN RCRD: CPT | Performed by: STUDENT IN AN ORGANIZED HEALTH CARE EDUCATION/TRAINING PROGRAM

## 2024-10-16 ASSESSMENT — ENCOUNTER SYMPTOMS
LOSS OF SENSATION IN FEET: 0
OCCASIONAL FEELINGS OF UNSTEADINESS: 0
DEPRESSION: 0

## 2024-10-16 ASSESSMENT — PATIENT HEALTH QUESTIONNAIRE - PHQ9
1. LITTLE INTEREST OR PLEASURE IN DOING THINGS: NOT AT ALL
2. FEELING DOWN, DEPRESSED OR HOPELESS: NOT AT ALL
SUM OF ALL RESPONSES TO PHQ9 QUESTIONS 1 AND 2: 0

## 2024-10-16 ASSESSMENT — PAIN SCALES - GENERAL: PAINLEVEL_OUTOF10: 0-NO PAIN

## 2024-10-16 NOTE — PROGRESS NOTES
Subjective   Patient ID: Nazia Warner is a 69 y.o. female who presents for Head Injury.  HPI      Nazia Warner is 69 y.o. is here to establish care    Chronic active medical problems   hyperlipidemia  Hypertension  Had a head injury; went to ER;: CT showed incidental 1cm menigioma   Denies any symptoms       Past Medical History:   Diagnosis Date    Cataract 2021    Diabetes mellitus (Multi) 2005    GERD (gastroesophageal reflux disease) 2015    Glaucoma 2018      Past Surgical History:   Procedure Laterality Date    WISDOM TOOTH EXTRACTION  1980      Family History   Problem Relation Name Age of Onset    Diabetes Mother Lina Warner     Hypertension Mother Lina Warner     Cancer Father Sabas Warner     Hypertension Father Sabas Warner     Heart disease Father Sabas Warner     Ovarian cancer Cousin 2nd cousin 80    Diabetes Maternal Grandfather Maricruz Drummond     Diabetes Sister Sister       Allergies   Allergen Reactions    Iodinated Contrast Media Rash and Hives    Iodine Rash and Wheezing     Other reaction(s): Unknown    Penicillins Rash and Hives    Sulfa (Sulfonamide Antibiotics) Hives, Itching, Rash and Unknown     A    Monosodium Glutamate Other     Dizziness. 6/16/15.bw    Other reaction(s): Other   Dizziness. 6/16/15.bw    Amoxicillin Unknown          Occupation:     Review of Systems   Constitutional:  Negative for activity change and fever.   HENT:  Negative for congestion.    Respiratory:  Negative for cough, shortness of breath and wheezing.    Cardiovascular:  Negative for chest pain and leg swelling.   Gastrointestinal:  Negative for abdominal pain, constipation, nausea and vomiting.   Endocrine: Negative for cold intolerance.   Genitourinary:  Negative for dysuria, hematuria and urgency.   Neurological:  Negative for dizziness, speech difficulty, weakness and numbness.   Psychiatric/Behavioral:  Negative for self-injury and suicidal ideas.        Objective   Visit Vitals  BP  124/76   Pulse 66   Wt (!) 42.2 kg (93 lb)   SpO2 97%   BMI 18.16 kg/m²   OB Status Postmenopausal   Smoking Status Never   BSA 1.34 m²      Physical Exam  Constitutional:       Appearance: Normal appearance.   HENT:      Head: Normocephalic and atraumatic.      Nose: Nose normal.      Mouth/Throat:      Mouth: Mucous membranes are moist.   Eyes:      Conjunctiva/sclera: Conjunctivae normal.      Pupils: Pupils are equal, round, and reactive to light.   Cardiovascular:      Rate and Rhythm: Normal rate and regular rhythm.      Pulses: Normal pulses.      Heart sounds: Normal heart sounds.   Pulmonary:      Effort: Pulmonary effort is normal.      Breath sounds: Normal breath sounds.   Musculoskeletal:         General: Normal range of motion.      Cervical back: Neck supple.   Skin:     General: Skin is warm.   Neurological:      General: No focal deficit present.      Mental Status: She is alert and oriented to person, place, and time.      Cranial Nerves: No cranial nerve deficit.      Sensory: No sensory deficit.      Motor: No weakness.      Coordination: Coordination normal.      Gait: Gait normal.      Deep Tendon Reflexes: Reflexes normal.   Psychiatric:         Mood and Affect: Mood normal.         Behavior: Behavior normal.         Thought Content: Thought content normal.         Judgment: Judgment normal.         Assessment/Plan   Diagnoses and all orders for this visit:  Health care maintenance  -     Flu vaccine, trivalent, preservative free, HIGH-DOSE, age 65y+ (Fluzone)  Head injury, initial encounter  -     Referral to Primary Care  Meningioma (Multi)  -     Referral to Primary Care  -     Referral to Neurosurgery; Future  -     Albumin-Creatinine Ratio, Urine Random; Future     Patient to seek medical attention immediately / call 911  if has worsening of symptoms  or develops alarm symptoms as discussed. Verbalizes understanding

## 2024-10-17 ENCOUNTER — APPOINTMENT (OUTPATIENT)
Dept: PRIMARY CARE | Facility: CLINIC | Age: 69
End: 2024-10-17
Payer: MEDICARE

## 2024-10-24 ASSESSMENT — ENCOUNTER SYMPTOMS
WHEEZING: 0
SHORTNESS OF BREATH: 0
FEVER: 0
SPEECH DIFFICULTY: 0
NAUSEA: 0
HEMATURIA: 0
DIZZINESS: 0
ABDOMINAL PAIN: 0
WEAKNESS: 0
CONSTIPATION: 0
DYSURIA: 0
COUGH: 0
ACTIVITY CHANGE: 0
VOMITING: 0
NUMBNESS: 0

## 2024-11-05 ENCOUNTER — APPOINTMENT (OUTPATIENT)
Dept: UROLOGY | Facility: HOSPITAL | Age: 69
End: 2024-11-05
Payer: MEDICARE

## 2024-11-05 DIAGNOSIS — N20.0 RENAL STONES: Primary | ICD-10-CM

## 2024-11-05 PROCEDURE — 3062F POS MACROALBUMINURIA REV: CPT | Performed by: STUDENT IN AN ORGANIZED HEALTH CARE EDUCATION/TRAINING PROGRAM

## 2024-11-05 PROCEDURE — 99214 OFFICE O/P EST MOD 30 MIN: CPT | Performed by: STUDENT IN AN ORGANIZED HEALTH CARE EDUCATION/TRAINING PROGRAM

## 2024-11-05 PROCEDURE — 3044F HG A1C LEVEL LT 7.0%: CPT | Performed by: STUDENT IN AN ORGANIZED HEALTH CARE EDUCATION/TRAINING PROGRAM

## 2024-11-05 PROCEDURE — G2211 COMPLEX E/M VISIT ADD ON: HCPCS | Performed by: STUDENT IN AN ORGANIZED HEALTH CARE EDUCATION/TRAINING PROGRAM

## 2024-11-05 PROCEDURE — 3048F LDL-C <100 MG/DL: CPT | Performed by: STUDENT IN AN ORGANIZED HEALTH CARE EDUCATION/TRAINING PROGRAM

## 2024-11-05 PROCEDURE — 1123F ACP DISCUSS/DSCN MKR DOCD: CPT | Performed by: STUDENT IN AN ORGANIZED HEALTH CARE EDUCATION/TRAINING PROGRAM

## 2024-11-05 NOTE — PROGRESS NOTES
"[facility]  Neurosurgery    History of Present Illness      Nazia Warner is a 69-year-old female with a PMH significant for DMT2, nephrolithiasis, OAB, glaucoma, who presented to the ED for evaluation after she hit her head on a door. No LOC but was experiencing severe headaches. Workup was significant for CTH which incidentally noted a meningioma. Patient was referred to neurosurgery for further evaluation and presents to clinic for FUV.     Headache has since resolved. Denies any vision changes, gait instability, or recent falls. She does not have a personal history of seizure. Has not had a prior CTH or MRI.       Objective      Vitals:   /69   Pulse 83   Resp 17   Ht 1.461 m (4' 9.5\")   Wt (!) 42.2 kg (93 lb)   BMI 19.78 kg/m²         Physical Exam:    A&Ox3   Fluent speech   EOMI; FC x 4   LEVI; strength 5/5; no drift   Face and shoulder shrug symmetrical   SILT   Tongue midline   No focal motor deficits on exam   Gait normal          Relevant Results:    CTH 09/26/24: Diffusely calcified R parietal location -10mm meningioma         Assessment & Plan      Diagnosis:  Nazia was seen today for consult.  Diagnoses and all orders for this visit:  Meningioma (Multi)  -     Referral to Neurosurgery  -     MR brain w and wo IV contrast; Future          Provider Impression:     Patient is a 69-year-old female presenting for initial evaluation after she underwent CT imaging after head injury and was incidentally found to have a small dural based lesion consistent with meningioma.     I discussed the natural history of intracranial meningomas, and discussed that many are found incidentally on intracranial imaging as an incidental finding. Patient is not symptomatic from the meningioma.     Therefore, majority of meningiomas remain stable or slowly increase in size over time. However  will order one short interval scan to be completed at 3 months to ensure stability and that meningioma is not " atypical. If that MRI is stable will plan for a follow up MRI brain w/wo in 1 year.     Imaging reviewed with patient. All questions answered.        Medical History     Past Medical History:   Diagnosis Date    Cataract 2021    Diabetes mellitus (Multi) 2005    GERD (gastroesophageal reflux disease) 2015    Glaucoma 2018     Past Surgical History:   Procedure Laterality Date    WISDOM TOOTH EXTRACTION  1980     Social History     Tobacco Use    Smoking status: Never    Smokeless tobacco: Never   Substance Use Topics    Alcohol use: Never    Drug use: Never     Family History   Problem Relation Name Age of Onset    Diabetes Mother Lina Warner     Hypertension Mother Lina Warner     Cancer Father Sabas Warner     Hypertension Father Sabas Warner     Heart disease Father Sabas Warner     Ovarian cancer Cousin 2nd cousin 80    Diabetes Maternal Grandfather Maricruz Drummond     Diabetes Sister Sister      Allergies   Allergen Reactions    Iodinated Contrast Media Rash and Hives    Iodine Rash and Wheezing     Other reaction(s): Unknown    Penicillins Rash and Hives    Sulfa (Sulfonamide Antibiotics) Hives, Itching, Rash and Unknown     A    Monosodium Glutamate Other     Dizziness. 6/16/15.bw    Other reaction(s): Other   Dizziness. 6/16/15.bw    Amoxicillin Unknown     Current Outpatient Medications   Medication Instructions    azelastine (Astelin) 137 mcg (0.1 %) nasal spray Every 12 hours    betamethasone dipropionate (Diprosone) 0.05 % lotion APPLY TO THE AFFECTED AREA ON SCALP ONCE DAILY    cetirizine (ZyrTEC) 10 mg tablet 1 (one) time each day at the same time    clobetasol (Temovate) 0.05 % external solution Apply twice daily to area on scalp    co-enzyme Q-10 60 mg, Daily RT    dapagliflozin propanediol (FARXIGA) 5 mg, oral, Every morning    diclofenac sodium (Voltaren) 1 % gel APPLY TO FEET FOR PAIN RELIEF    hydrOXYzine pamoate (VISTARIL) 25 mg, oral, Nightly PRN    ibuprofen 800 mg tablet  TAKE 1 TABLET BY MOUTH THREE TIMES DAILY OR AS NEEDED FOR PAIN    meclizine (ANTIVERT) 12.5 mg, Every 8 hours PRN    metFORMIN XR (Glucophage-XR) 500 mg 24 hr tablet Take 1 tablet (500 mg) by mouth 3 times a day. Take 1 tab in the morning and 2 tabs in the evening    minoxidil (Loniten) 10 mg tablet CRUSH 4 TABLETS INTO BETAMETHASONE LOTION AND APPLY TO THE AFFECTED AREA ON SCALP EVERY DAY. DO NOT SWALLOW    montelukast (SINGULAIR) 10 mg, Nightly    Nitro-Bid 2 % ointment APPLY A PEA SIZED AMOUNT TO TOP OF FEET ONCE DAILY. WEAR GLOVES FOR APPLICATION.    OneTouch Delica Plus Lancet 30 gauge misc USE AS DIRECTED TO CHECK BLOOD SUGAR TWICE DAILY    OneTouch Verio Flex meter misc USE AS DIRECTED TO CHECK BLOOD SUGAR TWICE DAILY    oxybutynin XL (DITROPAN-XL) 5 mg, oral, Daily    simvastatin (Zocor) 20 mg tablet     solifenacin (VESICARE) 5 mg, Daily    timolol (Timoptic) 0.5 % ophthalmic solution INSTILL 1 DROP IN BOTH EYES TWICE DAILY    triamcinolone (Kenalog) 0.1 % cream Topical, 2 times daily PRN    valACYclovir (VALTREX) 1,000 mg, Daily PRN

## 2024-11-05 NOTE — PROGRESS NOTES
Subjective   Patient ID: Nazia Warner is a 69 y.o. female    HPI  69 y.o. female who presents for follow up with a history of kidney stones, with the most recent stone being 0.8 centimeters on the left side. The stone is currently inside the kidney, not causing pain, pressure, or infection. The patient has a history of passing kidney stones once a year, typically due to inadequate hydration. The patient is considering options for management, including observation or shock wave lithotripsy (SWL). The patient prefers to avoid the pain associated with passing stones and is leaning towards scheduling SWL. A renal ultrasound and an x-ray of the kidneys will be performed to assess the stone's visibility and size.    The most recent XR abdomen 2 views supine and erect or decub, conducted on 10/15/2024, revealed:  1. Bilateral renal calculi.  2. A 1 cm left abdominal calcification is probably in bowel.  3. Small mid and left pelvic calcifications consistent with known uterine fibroids.    Review of Systems    All systems were reviewed. Anything negative was noted in the HPI.    Objective   Physical Exam    General: Well developed, well nourished, alert and cooperative, appears in no acute distress   Eyes: Non-injected conjunctiva, sclera clear, no proptosis   Cardiac: Extremities are warm and well perfused. No edema, cyanosis or pallor   Lungs: Breathing is easy, non-labored. Speaking in clear and complete sentences. Normal diaphragmatic movement   MSK: Ambulatory with steady gait, unassisted   Neuro: Alert and oriented to person, place, and time   Psych: Demonstrates good judgment and reason, without hallucinations, abnormal affect or abnormal behaviors   Skin: No obvious lesions, no rashes       No CVA tenderness bilaterally   No suprapubic pain or discomfort       Past Medical History:   Diagnosis Date    Cataract 2021    Diabetes mellitus (Multi) 2005    GERD (gastroesophageal reflux disease) 2015    Glaucoma 2018          Past Surgical History:   Procedure Laterality Date    WISDOM TOOTH EXTRACTION  1980           Assessment/Plan   Bilateral nephrolithiasis    69 y.o. female who presents for the above condition, We had a very long and extensive discussion with the patient regarding his condition.  I discussed with the patient the pathophysiology, differential diagnosis, risk factor, management of ureteral stones.  Explained to the patient that the stone is most probably still present given their persistent pain and the recent CT.  I gave the patient 3 options of management including observation which I discouraged given their episode of fever, the size of the location of the stone.  Explained that they have less likely chance of spontaneous passage of the stone.  We also discussed ESWL which would be appropriate for the size of the location of stone.  We discussed at length a left ureteroscopy, laser stone fragmentation, left retrograde pyelogram, left double-J stent insertion.  We discussed in detail the risk, benefit, potential complication, adverse events including hematuria, pneumaturia, pain, stent discomfort and pain, fever, chills, infection, urosepsis, I explained to the patient that most likely they need a second procedure at the first wound will be probably only a stent placement. I explained that the second procedure would be the actual laser stone fragmentation and exchange of their stent. Patient presents and elect to proceed with observation only     Plan:  - Follow up in 1 year with:  - [facility] X-Ray  - Renal US        11/5/2024    Paige Attestation  By signing my name below, Cardiad VERA Scribe   attest that this documentation has been prepared under the direction and in the presence of Dr. Ten Higuera

## 2024-11-11 ENCOUNTER — APPOINTMENT (OUTPATIENT)
Dept: NEUROSURGERY | Facility: HOSPITAL | Age: 69
End: 2024-11-11
Payer: MEDICARE

## 2024-11-11 VITALS
HEIGHT: 58 IN | SYSTOLIC BLOOD PRESSURE: 121 MMHG | RESPIRATION RATE: 17 BRPM | HEART RATE: 83 BPM | DIASTOLIC BLOOD PRESSURE: 69 MMHG | BODY MASS INDEX: 19.52 KG/M2 | WEIGHT: 93 LBS

## 2024-11-11 DIAGNOSIS — D32.9 MENINGIOMA (MULTI): ICD-10-CM

## 2024-11-11 PROCEDURE — 99202 OFFICE O/P NEW SF 15 MIN: CPT | Performed by: NURSE PRACTITIONER

## 2024-11-11 PROCEDURE — 3044F HG A1C LEVEL LT 7.0%: CPT | Performed by: NURSE PRACTITIONER

## 2024-11-11 PROCEDURE — 1159F MED LIST DOCD IN RCRD: CPT | Performed by: NURSE PRACTITIONER

## 2024-11-11 PROCEDURE — 3048F LDL-C <100 MG/DL: CPT | Performed by: NURSE PRACTITIONER

## 2024-11-11 PROCEDURE — 99212 OFFICE O/P EST SF 10 MIN: CPT | Performed by: NURSE PRACTITIONER

## 2024-11-11 PROCEDURE — 3008F BODY MASS INDEX DOCD: CPT | Performed by: NURSE PRACTITIONER

## 2024-11-11 PROCEDURE — 3078F DIAST BP <80 MM HG: CPT | Performed by: NURSE PRACTITIONER

## 2024-11-11 PROCEDURE — 1123F ACP DISCUSS/DSCN MKR DOCD: CPT | Performed by: NURSE PRACTITIONER

## 2024-11-11 PROCEDURE — 3074F SYST BP LT 130 MM HG: CPT | Performed by: NURSE PRACTITIONER

## 2024-11-11 PROCEDURE — 3062F POS MACROALBUMINURIA REV: CPT | Performed by: NURSE PRACTITIONER

## 2024-11-27 ENCOUNTER — HOSPITAL ENCOUNTER (OUTPATIENT)
Dept: RADIOLOGY | Facility: CLINIC | Age: 69
Discharge: HOME | End: 2024-11-27
Payer: MEDICARE

## 2024-11-27 DIAGNOSIS — D32.9 MENINGIOMA (MULTI): ICD-10-CM

## 2024-11-27 PROCEDURE — 70553 MRI BRAIN STEM W/O & W/DYE: CPT | Performed by: RADIOLOGY

## 2024-11-27 PROCEDURE — A9575 INJ GADOTERATE MEGLUMI 0.1ML: HCPCS | Performed by: NURSE PRACTITIONER

## 2024-11-27 PROCEDURE — 2550000001 HC RX 255 CONTRASTS: Performed by: NURSE PRACTITIONER

## 2024-11-27 PROCEDURE — 70553 MRI BRAIN STEM W/O & W/DYE: CPT

## 2024-11-27 RX ORDER — GADOTERATE MEGLUMINE 376.9 MG/ML
9 INJECTION INTRAVENOUS
Status: COMPLETED | OUTPATIENT
Start: 2024-11-27 | End: 2024-11-27

## 2024-12-17 DIAGNOSIS — D32.9 MENINGIOMA (MULTI): Primary | ICD-10-CM

## 2024-12-23 ENCOUNTER — APPOINTMENT (OUTPATIENT)
Dept: PRIMARY CARE | Facility: CLINIC | Age: 69
End: 2024-12-23
Payer: MEDICARE

## 2025-01-28 ENCOUNTER — APPOINTMENT (OUTPATIENT)
Dept: PRIMARY CARE | Facility: CLINIC | Age: 70
End: 2025-01-28
Payer: MEDICARE

## 2025-01-28 VITALS
HEART RATE: 79 BPM | SYSTOLIC BLOOD PRESSURE: 116 MMHG | HEIGHT: 58 IN | DIASTOLIC BLOOD PRESSURE: 76 MMHG | BODY MASS INDEX: 19.31 KG/M2 | WEIGHT: 92 LBS | OXYGEN SATURATION: 98 %

## 2025-01-28 DIAGNOSIS — Z78.0 ASYMPTOMATIC MENOPAUSAL STATE: ICD-10-CM

## 2025-01-28 DIAGNOSIS — E11.9 TYPE 2 DIABETES MELLITUS WITHOUT COMPLICATION, WITHOUT LONG-TERM CURRENT USE OF INSULIN (MULTI): ICD-10-CM

## 2025-01-28 DIAGNOSIS — M85.80 OSTEOPENIA, UNSPECIFIED LOCATION: ICD-10-CM

## 2025-01-28 DIAGNOSIS — Z12.31 ENCOUNTER FOR SCREENING MAMMOGRAM FOR MALIGNANT NEOPLASM OF BREAST: ICD-10-CM

## 2025-01-28 DIAGNOSIS — Z00.00 HEALTH CARE MAINTENANCE: Primary | ICD-10-CM

## 2025-01-28 DIAGNOSIS — Z00.00 ROUTINE GENERAL MEDICAL EXAMINATION AT HEALTH CARE FACILITY: ICD-10-CM

## 2025-01-28 DIAGNOSIS — E78.5 HYPERLIPIDEMIA LDL GOAL <100: ICD-10-CM

## 2025-01-28 DIAGNOSIS — N39.46 MIXED STRESS AND URGE URINARY INCONTINENCE: ICD-10-CM

## 2025-01-28 PROCEDURE — 99214 OFFICE O/P EST MOD 30 MIN: CPT | Performed by: STUDENT IN AN ORGANIZED HEALTH CARE EDUCATION/TRAINING PROGRAM

## 2025-01-28 PROCEDURE — 3008F BODY MASS INDEX DOCD: CPT | Performed by: STUDENT IN AN ORGANIZED HEALTH CARE EDUCATION/TRAINING PROGRAM

## 2025-01-28 PROCEDURE — 1123F ACP DISCUSS/DSCN MKR DOCD: CPT | Performed by: STUDENT IN AN ORGANIZED HEALTH CARE EDUCATION/TRAINING PROGRAM

## 2025-01-28 PROCEDURE — 1170F FXNL STATUS ASSESSED: CPT | Performed by: STUDENT IN AN ORGANIZED HEALTH CARE EDUCATION/TRAINING PROGRAM

## 2025-01-28 PROCEDURE — 3078F DIAST BP <80 MM HG: CPT | Performed by: STUDENT IN AN ORGANIZED HEALTH CARE EDUCATION/TRAINING PROGRAM

## 2025-01-28 PROCEDURE — 3074F SYST BP LT 130 MM HG: CPT | Performed by: STUDENT IN AN ORGANIZED HEALTH CARE EDUCATION/TRAINING PROGRAM

## 2025-01-28 PROCEDURE — 99397 PER PM REEVAL EST PAT 65+ YR: CPT | Performed by: STUDENT IN AN ORGANIZED HEALTH CARE EDUCATION/TRAINING PROGRAM

## 2025-01-28 PROCEDURE — G0439 PPPS, SUBSEQ VISIT: HCPCS | Performed by: STUDENT IN AN ORGANIZED HEALTH CARE EDUCATION/TRAINING PROGRAM

## 2025-01-28 RX ORDER — DAPAGLIFLOZIN 5 MG/1
5 TABLET, FILM COATED ORAL EVERY MORNING
Qty: 90 TABLET | Refills: 2 | Status: SHIPPED | OUTPATIENT
Start: 2025-01-28

## 2025-01-28 ASSESSMENT — PATIENT HEALTH QUESTIONNAIRE - PHQ9
SUM OF ALL RESPONSES TO PHQ9 QUESTIONS 1 AND 2: 1
2. FEELING DOWN, DEPRESSED OR HOPELESS: SEVERAL DAYS
1. LITTLE INTEREST OR PLEASURE IN DOING THINGS: NOT AT ALL
2. FEELING DOWN, DEPRESSED OR HOPELESS: SEVERAL DAYS
10. IF YOU CHECKED OFF ANY PROBLEMS, HOW DIFFICULT HAVE THESE PROBLEMS MADE IT FOR YOU TO DO YOUR WORK, TAKE CARE OF THINGS AT HOME, OR GET ALONG WITH OTHER PEOPLE: NOT DIFFICULT AT ALL
1. LITTLE INTEREST OR PLEASURE IN DOING THINGS: NOT AT ALL
SUM OF ALL RESPONSES TO PHQ9 QUESTIONS 1 AND 2: 1

## 2025-01-28 ASSESSMENT — ENCOUNTER SYMPTOMS
DEPRESSION: 1
OCCASIONAL FEELINGS OF UNSTEADINESS: 0
LOSS OF SENSATION IN FEET: 0

## 2025-01-28 ASSESSMENT — ACTIVITIES OF DAILY LIVING (ADL)
GROCERY_SHOPPING: INDEPENDENT
BATHING: INDEPENDENT
MANAGING_FINANCES: INDEPENDENT
DRESSING: INDEPENDENT
DOING_HOUSEWORK: INDEPENDENT
TAKING_MEDICATION: INDEPENDENT

## 2025-01-28 NOTE — PROGRESS NOTES
Subjective   Patient ID: Nazia Warner is a 69 y.o. female who presents for Medicare wellness visit HPI patient is here for Medicare visit.  No concerns  Chronic active medical problems   hyperlipidemia  Hypertension simvastatin   Dm: metformin 500 TID ; farxiga  Had a head injury; went to ER;: CT showed incidental 1cm menigioma :follow up in 1 year for neuro durgery     Denies any symptoms     Plan:  Labs  Colonoscopy   MMG      Yoanna     Past Medical History:   Diagnosis Date    Cataract 2021    Diabetes mellitus (Multi) 2005    GERD (gastroesophageal reflux disease) 2015    Glaucoma 2018      Past Surgical History:   Procedure Laterality Date    WISDOM TOOTH EXTRACTION  1980      Family History   Problem Relation Name Age of Onset    Diabetes Mother Lina Warner     Hypertension Mother Lina Warner     Cancer Father Sabas Warner     Hypertension Father Sabas Warner     Heart disease Father Sabas Warner     Ovarian cancer Cousin 2nd cousin 80    Diabetes Maternal Grandfather Maricruz Drummond     Diabetes Sister Sister       Allergies   Allergen Reactions    Iodinated Contrast Media Rash and Hives    Iodine Rash and Wheezing     Other reaction(s): Unknown    Penicillins Rash and Hives    Sulfa (Sulfonamide Antibiotics) Hives, Itching, Rash and Unknown     A    Monosodium Glutamate Other     Dizziness. 6/16/15.bw    Other reaction(s): Other   Dizziness. 6/16/15.bw    Amoxicillin Unknown          Occupation:     Review of Systems   Constitutional:  Negative for activity change and fever.   HENT:  Negative for congestion.    Respiratory:  Negative for cough, shortness of breath and wheezing.    Cardiovascular:  Negative for chest pain and leg swelling.   Gastrointestinal:  Negative for abdominal pain, constipation, nausea and vomiting.   Endocrine: Negative for cold intolerance.   Genitourinary:  Negative for dysuria, hematuria and urgency.   Neurological:  Negative for dizziness, speech difficulty,  "weakness and numbness.   Psychiatric/Behavioral:  Negative for self-injury and suicidal ideas.        Objective   Visit Vitals  /76 (BP Location: Left arm, Patient Position: Sitting, BP Cuff Size: Adult)   Pulse 79   Ht 1.473 m (4' 10\")   Wt (!) 41.7 kg (92 lb)   SpO2 98%   BMI 19.23 kg/m²   OB Status Postmenopausal   Smoking Status Never   BSA 1.31 m²      Physical Exam  HENT:      Head: Normocephalic and atraumatic.      Right Ear: Tympanic membrane normal.      Left Ear: Tympanic membrane normal.      Nose: Nose normal.      Mouth/Throat:      Mouth: Mucous membranes are moist.   Eyes:      Extraocular Movements: Extraocular movements intact.      Conjunctiva/sclera: Conjunctivae normal.      Pupils: Pupils are equal, round, and reactive to light.   Cardiovascular:      Rate and Rhythm: Normal rate and regular rhythm.      Pulses: Normal pulses.      Heart sounds: Normal heart sounds.   Pulmonary:      Effort: Pulmonary effort is normal.      Breath sounds: Normal breath sounds. No stridor. No rhonchi.   Abdominal:      General: Bowel sounds are normal.      Palpations: Abdomen is soft.      Tenderness: There is no abdominal tenderness. There is no guarding or rebound.   Musculoskeletal:      Cervical back: Neck supple.   Neurological:      Mental Status: She is oriented to person, place, and time.   Psychiatric:         Mood and Affect: Mood normal.         Behavior: Behavior normal.         Assessment/Plan   Diagnoses and all orders for this visit:  Health care maintenance  -     BI mammo bilateral screening tomosynthesis; Future  -     Colonoscopy Screening; Average Risk Patient; Future  Type 2 diabetes mellitus without complication, without long-term current use of insulin (Multi)  -     Referral to Nutrition Services; Future  -     Hemoglobin A1C; Future  -     Albumin-Creatinine Ratio, Urine Random; Future  -     CBC  -     Lipid Panel; Future  -     Comprehensive Metabolic Panel; Future  -     TSH " with reflex to Free T4 if abnormal; Future  Hyperlipidemia LDL goal <100  Asymptomatic menopausal state  Osteopenia, unspecified location  -     Vitamin D 25 hydroxy; Future  Encounter for screening mammogram for malignant neoplasm of breast  -     BI mammo bilateral screening tomosynthesis; Future  Mixed stress and urge urinary incontinence  -     Referral to Physical Therapy; Future  Routine general medical examination at health care facility  -     1 Year Follow Up In [facility] - PCP - Wellness Exam; Future

## 2025-01-28 NOTE — PATIENT INSTRUCTIONS
It was nice seeing you today   Here is a a summary of what we discussed -  Your Blood pressure is at goal today. Please take a low salt diet and exercise atleast for 150min/week  We have ordered some labs for you. Please proceed to the our lab at [address] or [address] to give these labs. Please ensure these labs are given in a fasting state.   3. Other imaging/procedure orders I have put in for you are   Colonoscopy   Ultrasound for abdominal aortic aneurysm screening    Please call the scheduling line below to set up the appointment    4, Stelo by Dexcom for glucose monitoring     Once all the results are obtained we will call you with abnormal results if any and discuss necessity medication/lifestyle changes/further evaluation.  Please feel free to call our office at [phone] with any questions  Please do not hesitate to call us for medication refills.  In case of emergency please proceed to the nearest emergency room or call 911.    Please follow-up with me in 3 months.  You can set your appointment by stopping by at the  or calling our office at [phone] or logging onto [employer] account.

## 2025-01-29 DIAGNOSIS — Z12.11 COLON CANCER SCREENING: ICD-10-CM

## 2025-01-30 RX ORDER — SODIUM, POTASSIUM,MAG SULFATES 17.5-3.13G
SOLUTION, RECONSTITUTED, ORAL ORAL
Qty: 1 EACH | Refills: 0 | Status: SHIPPED | OUTPATIENT
Start: 2025-01-30

## 2025-02-11 ENCOUNTER — APPOINTMENT (OUTPATIENT)
Age: 70
End: 2025-02-11
Payer: MEDICARE

## 2025-03-03 ENCOUNTER — APPOINTMENT (OUTPATIENT)
Dept: GASTROENTEROLOGY | Facility: EXTERNAL LOCATION | Age: 70
End: 2025-03-03
Payer: MEDICARE

## 2025-03-03 DIAGNOSIS — Z12.11 COLON CANCER SCREENING: Primary | ICD-10-CM

## 2025-03-03 DIAGNOSIS — D12.2 BENIGN NEOPLASM OF ASCENDING COLON: ICD-10-CM

## 2025-03-03 DIAGNOSIS — D12.5 BENIGN NEOPLASM OF SIGMOID COLON: ICD-10-CM

## 2025-03-03 DIAGNOSIS — Z00.00 HEALTH CARE MAINTENANCE: ICD-10-CM

## 2025-03-03 PROCEDURE — 88305 TISSUE EXAM BY PATHOLOGIST: CPT | Performed by: PATHOLOGY

## 2025-03-03 PROCEDURE — 45385 COLONOSCOPY W/LESION REMOVAL: CPT | Performed by: INTERNAL MEDICINE

## 2025-03-03 PROCEDURE — 0753T DGTZ GLS MCRSCP SLD LEVEL IV: CPT

## 2025-03-03 PROCEDURE — 88305 TISSUE EXAM BY PATHOLOGIST: CPT

## 2025-03-03 PROCEDURE — 1123F ACP DISCUSS/DSCN MKR DOCD: CPT | Performed by: INTERNAL MEDICINE

## 2025-03-03 ASSESSMENT — ENCOUNTER SYMPTOMS
SHORTNESS OF BREATH: 0
WHEEZING: 0
DIZZINESS: 0
COUGH: 0
VOMITING: 0
CONSTIPATION: 0
NUMBNESS: 0
SPEECH DIFFICULTY: 0
ACTIVITY CHANGE: 0
HEMATURIA: 0
ABDOMINAL PAIN: 0
NAUSEA: 0
WEAKNESS: 0
FEVER: 0
DYSURIA: 0

## 2025-03-04 ENCOUNTER — LAB REQUISITION (OUTPATIENT)
Dept: LAB | Facility: HOSPITAL | Age: 70
End: 2025-03-04
Payer: MEDICARE

## 2025-03-04 DIAGNOSIS — Z12.11 ENCOUNTER FOR SCREENING FOR MALIGNANT NEOPLASM OF COLON: ICD-10-CM

## 2025-03-10 ENCOUNTER — APPOINTMENT (OUTPATIENT)
Dept: NUTRITION | Facility: CLINIC | Age: 70
End: 2025-03-10
Payer: MEDICARE

## 2025-03-12 ENCOUNTER — EVALUATION (OUTPATIENT)
Dept: PHYSICAL THERAPY | Facility: CLINIC | Age: 70
End: 2025-03-12
Payer: MEDICARE

## 2025-03-12 DIAGNOSIS — N39.41 URGE INCONTINENCE OF URINE: ICD-10-CM

## 2025-03-12 LAB
LABORATORY COMMENT REPORT: NORMAL
PATH REPORT.FINAL DX SPEC: NORMAL
PATH REPORT.GROSS SPEC: NORMAL
PATH REPORT.RELEVANT HX SPEC: NORMAL
PATH REPORT.TOTAL CANCER: NORMAL

## 2025-03-12 PROCEDURE — 97530 THERAPEUTIC ACTIVITIES: CPT | Mod: GP | Performed by: PHYSICAL THERAPIST

## 2025-03-12 PROCEDURE — 97110 THERAPEUTIC EXERCISES: CPT | Mod: GP | Performed by: PHYSICAL THERAPIST

## 2025-03-12 PROCEDURE — 97161 PT EVAL LOW COMPLEX 20 MIN: CPT | Mod: GP | Performed by: PHYSICAL THERAPIST

## 2025-03-12 NOTE — PROGRESS NOTES
Physical Therapy    PELVIC FLOOR EVALUATION AND TREATMENT    Name: Nazia Warner  MRN: [id number]  : 1955  Today's Date: 25     Time Calculation  Start Time: 810  Stop Time: 905  Time Calculation (min): 55 min    PT Evaluation Time Entry  PT Evaluation (Low) Time Entry: 25  PT Therapeutic Procedures Time Entry  Therapeutic Exercise Time Entry: 10  Therapeutic Activity Time Entry: 15     Visit: 1  Insurance: [employer]  Auth: Required  $25 copay    Assessment:   Pt presents to clinic with chief complaint of urge UI that has been ongoing for multiple years. Pt previously seen in PFPT for urgency, frequency, and UUI with some improvement. Pt demonstrates increased tension and reduced ability to eccentrically lengthen the pelvic floor following contraction. Pt will benefit from skilled therapy to address current impairments for improved pelvic floor strength and reduced urinary incontinence         Plan:   Treatment/Interventions: Cryotherapy, Dry needling, Education/ Instruction, Electrical stimulation, Hot pack, Manual therapy, Neuromuscular re-education, Self care/ home management, Therapeutic activities, Therapeutic exercises  PT Plan: Skilled PT  PT Frequency: 1 time per week  Duration: 4-6 weeks  Onset Date: 24  Certification Period Start Date: 25  Certification Period End Date: 06/10/25  Rehab Potential: Good  Plan of Care Agreement: Patient      Current Problem:  1. Urge incontinence of urine  Referral to Physical Therapy    Follow Up In Physical Therapy          Subjective   General  Reason for Referral: Urge UI  Referred By: Dr. Garcia  Preferred Learning Style: verbal, visual, written  Precautions  STEADI Fall Risk Score (The score of 4 or more indicates an increased risk of falling): 0  Precautions Comment: None       Objective   PELVIC HISTORY:    Chief Complaint/Description of Symptoms:   Pt presents to clinic with chief complaint of urge UI that has been ongoing  for the last year or so   Pt was previously seen in PFPT for similar symptoms       HPI:   Symptoms began multiple years ago; began with urgency and frequency; this has improved overall   She continues to endorse urinary leakage while on the way to the bathroom     Home Environment/Social Factors/Occupation:   No previous pregnancies  No previous pelvic or abdominal surgeries       PELVIC PAIN:     No pain symptoms per pt     Since onset, the symptoms are: Improving overall   Pain when emptying bladder: No  Pain with wiping or tight clothing: No   Pain with intercourse: Does endorse discomfort/pain with IC   History of back pain: Intermittent low back pain       EXERCISE:  Do you do Kegels? No    Current exercise regime: Attempting to get back into regular exercise     BLADDER:     Hydration: about 16-24 oz per day   Excessive Urinary Urgency: Yes; sometimes   Daytime Voiding Frequency: 5-6x/day   Nighttime Voiding Frequency: 1x/night   Unintentional urine loss frequency: feels dependent on fluid; a couple of times per week   Leakage occurs with: urgency   Leakage amount: varies to small to large amount    Difficulty initiating flow of urine: No   Slow/weak urine stream: No  Difficulty starting urine stream/push to urinate: No   Able to completely empty bladder: Yes   Tests performed by doctor: no, PCP referral  Frequent UTI's: No     BOWEL:     Excessive Bowel Urgency: No   BM Frequency: every other day   Frequent Diarrhea: No   Frequent constipation/straining/incomplete emptying: no   [place] Stool Scale rating: Type 4    Unintentional stool loss: No       EXTERNAL OBSERVATION:  Voluntary Contraction: present   Voluntary Relaxation: incomplete  Involuntary Contraction: present  Involuntary Relaxation incomplete        INTERNAL VAGINAL EXAMINATION:  PFM Strength: 3/5      INTERNAL PALPATION:   Tenderness with palpation of bulbocavernosus, ischiocavernosus, levator ani group bilat   R more tenderness vs L   6/10  discomfort per pt    Tension noted throughout pelvic floor with difficulty with eccentric lengthening following Kegel contraction       EXTERNAL PALPATION:  Levator Ani: No Pain/Tightness R, no Pain/Tightness L  Bulbo: no Pain/Tightness R, no Pain/Tightness L  Ischiocavernosus: no Pain/Tightness R, no Pain/Tightness L  Transverse perineum: no Pain/Tightness R, no Pain/Tightness L      OUTCOMES MEASURE:  Female NIH Chronic Prostatitis Symptom index: 9    TREATMENT  Internal examination performed with informed pt consent     Therapeutic activity:  Review and instruction on pelvic floor anatomy, assessment of pelvic floor, potential cause of current symptoms  Review of urgency control techniques to begin at home    Therapeutic exercise:  Butterfly stretch  Happy baby stretch  Cat cow   Child's pose stretch      Careplan Goals:  Problem: PT Problem       Goal: Pt will be independent in urge control techniques for reduced UUI          Goal: Pt will demonstrate improved eccentric lengthening of pelvic floor for improved pressure management and improved bladder control           Goal: Pt will be independent in HEP for home maintenance            Pedro Burns PT    Insurance Authorization Information  Date of Evaluation: 3/12/25    Onset Date: 2024    Referring Physician: Grace Kenney     Surgery in the Last 3 months:  no    CPT Codes: Therapeutic Exercise: 75698 Therapeutic Activity: 83959 Neuromuscular Re-Education: 90128 Manual Therapy: 39937 Gait Trainin Self-Care/Home Management Trainin Mechanical Traction: 59755 Electric Stimulation (Attended): 06205 Electric Stimulation (Unattended): 20160 Vasopneumatic Device: 13124 PT Re-Evaluation: 87312     Diagnosis:   Problem List Items Addressed This Visit             ICD-10-CM    Incontinence R32    Relevant Orders    Follow Up In Physical Therapy          Functional Outcome:     See above    OT / PT Evaluation complexity:  low    Which  of the following best describes the primary reason of therapy: Improving, restoring, or adapting functional mobility or skills    Visits Requested: 7    Date Range: 90 days    Select all conditions that apply: None of these apply     Pedro Burns, PT

## 2025-03-13 ASSESSMENT — ENCOUNTER SYMPTOMS
DEPRESSION: 0
OCCASIONAL FEELINGS OF UNSTEADINESS: 0
LOSS OF SENSATION IN FEET: 0

## 2025-03-18 NOTE — RESULT ENCOUNTER NOTE
Dear Ms. Timmy:    During your colonoscopy, polyp(s) were seen, removed, and sent to pathology for evaluation. These types of polyps are benign and are pre-cancerous polyps. This does NOT mean you have colon cancer, rather these lesions were removed to prevent them from developing into cancer. Since you had these types of lesions, you are at risk for developing similar recurrent polyps and have a slightly higher risk of developing colon cancer as a result. Studies suggest that by continued surveillance and removal of any subsequent polyps, we can help prevent the development of colon cancer.    At this time, we recommend a repeat colonoscopy in THREE (3) year(s).    We hope this information is helpful to you. Your health and the quality of care you receive are important to us. Please call our office should you have any questions or concerns.    Sincerely,    Leah Bassett MD [facility]  [phone], option 6

## 2025-04-07 ENCOUNTER — APPOINTMENT (OUTPATIENT)
Dept: PHYSICAL THERAPY | Facility: CLINIC | Age: 70
End: 2025-04-07
Payer: MEDICARE

## 2025-04-07 DIAGNOSIS — N39.41 URGE INCONTINENCE OF URINE: ICD-10-CM

## 2025-04-07 PROCEDURE — 97110 THERAPEUTIC EXERCISES: CPT | Mod: GP | Performed by: PHYSICAL THERAPIST

## 2025-04-07 NOTE — PROGRESS NOTES
Physical Therapy    PELVIC FLOOR TREATMENT    Name: Nazia Warner  MRN: [id number]  : 1955  Today's Date: 25     Time Calculation  Start Time: 1050  Stop Time: 1123  Time Calculation (min): 33 min       PT Therapeutic Procedures Time Entry  Therapeutic Exercise Time Entry: 25     Visit: 2  Insurance: [employer]  Auth: No Auth  $25 copay    Assessment:   Pt did well with additional hip flexibility, mobility; will continue to focus on pelvic floor down training/relaxation for improved control   Still notes leakage more with urgency, while getting to the bathroom     Plan:   Half kneeling flexibility as able  Lunge work with breath coordination  Review urgency control       Current Problem:  1. Urge incontinence of urine  Follow Up In Physical Therapy          Subjective   Pt reports a couple of small accidents since last session  Still noted with urgency  Small amount of urine leakage     Exercises for the low back are helping       Objective     EXTERNAL OBSERVATION:  Voluntary Contraction: present   Voluntary Relaxation: incomplete  Involuntary Contraction: present  Involuntary Relaxation incomplete        INTERNAL VAGINAL EXAMINATION:  PFM Strength: 3/5      INTERNAL PALPATION:   Tenderness with palpation of bulbocavernosus, ischiocavernosus, levator ani group bilat   R more tenderness vs L   6/10 discomfort per pt    Tension noted throughout pelvic floor with difficulty with eccentric lengthening following Kegel contraction       EXTERNAL PALPATION:  Levator Ani: No Pain/Tightness R, no Pain/Tightness L  Bulbo: no Pain/Tightness R, no Pain/Tightness L  Ischiocavernosus: no Pain/Tightness R, no Pain/Tightness L  Transverse perineum: no Pain/Tightness R, no Pain/Tightness L      TREATMENT  Therapeutic exercise:  Butterfly stretch x 1 min hold ea   Happy baby stretch x 1 min hold   Figure 4 stretch x 1 min  Reverse clamshells 2 x 10 ea    Cat cow x 10   Child's pose stretch x 10 with 3 hold  Deep  squat with pelvic floor relaxation, breath coordination x 10         Careplan Goals:  Problem: PT Problem       Goal: Pt will be independent in urge control techniques for reduced UUI          Goal: Pt will demonstrate improved eccentric lengthening of pelvic floor for improved pressure management and improved bladder control           Goal: Pt will be independent in HEP for home maintenance            Pedro Burns, PT

## 2025-04-11 LAB
25(OH)D3+25(OH)D2 SERPL-MCNC: 71 NG/ML (ref 30–100)
ALBUMIN SERPL-MCNC: 4.5 G/DL (ref 3.6–5.1)
ALBUMIN/CREAT UR: 39 MG/G CREAT
ALP SERPL-CCNC: 47 U/L (ref 37–153)
ALT SERPL-CCNC: 16 U/L (ref 6–29)
ANION GAP SERPL CALCULATED.4IONS-SCNC: 10 MMOL/L (CALC) (ref 7–17)
AST SERPL-CCNC: 16 U/L (ref 10–35)
BILIRUB SERPL-MCNC: 0.6 MG/DL (ref 0.2–1.2)
BUN SERPL-MCNC: 16 MG/DL (ref 7–25)
CALCIUM SERPL-MCNC: 9.3 MG/DL (ref 8.6–10.4)
CHLORIDE SERPL-SCNC: 106 MMOL/L (ref 98–110)
CHOLEST SERPL-MCNC: 185 MG/DL
CHOLEST/HDLC SERPL: 2.2 (CALC)
CO2 SERPL-SCNC: 23 MMOL/L (ref 20–32)
CREAT SERPL-MCNC: 0.72 MG/DL (ref 0.6–1)
CREAT UR-MCNC: 33 MG/DL (ref 20–275)
EGFRCR SERPLBLD CKD-EPI 2021: 89 ML/MIN/1.73M2
ERYTHROCYTE [DISTWIDTH] IN BLOOD BY AUTOMATED COUNT: 13.5 % (ref 11–15)
EST. AVERAGE GLUCOSE BLD GHB EST-MCNC: 146 MG/DL
EST. AVERAGE GLUCOSE BLD GHB EST-SCNC: 8.1 MMOL/L
GLUCOSE SERPL-MCNC: 121 MG/DL (ref 65–99)
HBA1C MFR BLD: 6.7 % OF TOTAL HGB
HCT VFR BLD AUTO: 42.9 % (ref 35–45)
HDLC SERPL-MCNC: 84 MG/DL
HGB BLD-MCNC: 13.7 G/DL (ref 11.7–15.5)
LDLC SERPL CALC-MCNC: 84 MG/DL (CALC)
MCH RBC QN AUTO: 27 PG (ref 27–33)
MCHC RBC AUTO-ENTMCNC: 31.9 G/DL (ref 32–36)
MCV RBC AUTO: 84.4 FL (ref 80–100)
MICROALBUMIN UR-MCNC: 1.3 MG/DL
NONHDLC SERPL-MCNC: 101 MG/DL (CALC)
PLATELET # BLD AUTO: 183 THOUSAND/UL (ref 140–400)
PMV BLD REES-ECKER: 10.5 FL (ref 7.5–12.5)
POTASSIUM SERPL-SCNC: 4.4 MMOL/L (ref 3.5–5.3)
PROT SERPL-MCNC: 6.9 G/DL (ref 6.1–8.1)
RBC # BLD AUTO: 5.08 MILLION/UL (ref 3.8–5.1)
SODIUM SERPL-SCNC: 139 MMOL/L (ref 135–146)
TRIGL SERPL-MCNC: 76 MG/DL
TSH SERPL-ACNC: 0.73 MIU/L (ref 0.4–4.5)
WBC # BLD AUTO: 3.5 THOUSAND/UL (ref 3.8–10.8)

## 2025-04-15 ENCOUNTER — APPOINTMENT (OUTPATIENT)
Dept: PRIMARY CARE | Facility: CLINIC | Age: 70
End: 2025-04-15
Payer: MEDICARE

## 2025-04-15 VITALS
SYSTOLIC BLOOD PRESSURE: 119 MMHG | BODY MASS INDEX: 19.23 KG/M2 | HEIGHT: 58 IN | OXYGEN SATURATION: 97 % | HEART RATE: 63 BPM | WEIGHT: 91.6 LBS | DIASTOLIC BLOOD PRESSURE: 76 MMHG

## 2025-04-15 DIAGNOSIS — D72.819 LEUKOPENIA, UNSPECIFIED TYPE: Primary | ICD-10-CM

## 2025-04-15 DIAGNOSIS — E78.5 HYPERLIPIDEMIA LDL GOAL <100: ICD-10-CM

## 2025-04-15 DIAGNOSIS — E11.9 TYPE 2 DIABETES MELLITUS WITHOUT COMPLICATION, WITHOUT LONG-TERM CURRENT USE OF INSULIN: ICD-10-CM

## 2025-04-15 PROCEDURE — 3078F DIAST BP <80 MM HG: CPT | Performed by: STUDENT IN AN ORGANIZED HEALTH CARE EDUCATION/TRAINING PROGRAM

## 2025-04-15 PROCEDURE — 1123F ACP DISCUSS/DSCN MKR DOCD: CPT | Performed by: STUDENT IN AN ORGANIZED HEALTH CARE EDUCATION/TRAINING PROGRAM

## 2025-04-15 PROCEDURE — 3008F BODY MASS INDEX DOCD: CPT | Performed by: STUDENT IN AN ORGANIZED HEALTH CARE EDUCATION/TRAINING PROGRAM

## 2025-04-15 PROCEDURE — 99214 OFFICE O/P EST MOD 30 MIN: CPT | Performed by: STUDENT IN AN ORGANIZED HEALTH CARE EDUCATION/TRAINING PROGRAM

## 2025-04-15 PROCEDURE — 3074F SYST BP LT 130 MM HG: CPT | Performed by: STUDENT IN AN ORGANIZED HEALTH CARE EDUCATION/TRAINING PROGRAM

## 2025-04-15 PROCEDURE — 1159F MED LIST DOCD IN RCRD: CPT | Performed by: STUDENT IN AN ORGANIZED HEALTH CARE EDUCATION/TRAINING PROGRAM

## 2025-04-15 PROCEDURE — 1036F TOBACCO NON-USER: CPT | Performed by: STUDENT IN AN ORGANIZED HEALTH CARE EDUCATION/TRAINING PROGRAM

## 2025-04-15 ASSESSMENT — ENCOUNTER SYMPTOMS
SPEECH DIFFICULTY: 0
ACTIVITY CHANGE: 0
FEVER: 0
ABDOMINAL PAIN: 0
COUGH: 0
CONSTIPATION: 0
SHORTNESS OF BREATH: 0
NAUSEA: 0
WHEEZING: 0
NUMBNESS: 0
VOMITING: 0
WEAKNESS: 0
DYSURIA: 0
HEMATURIA: 0
DIZZINESS: 0

## 2025-04-15 NOTE — PROGRESS NOTES
Subjective   Patient ID: Nazia Warner is a 70 y.o. female who presents for Follow-up.  HPI Ms. Mandujano is here for a follow-up.  No concerns endorsed    Chronic active medical problems   hyperlipidemia  Hypertension simvastatin   Dm: metformin 500 TID ; farxiga- observe albuminuria  had a head injury; went to ER;: CT showed incidental 1cm menigioma :follow up in 1 year for neuro durgery        Downtrending WBC for the past 1 to 2 years.  No weight loss or appetite loss reported.  No night sweats reported._ hem onc consult    Other labs discussed-mild albuminuria.  Will continue to observe with Farxiga.  Blood pressure within normal limits.  Will discuss further medications such as ACEs or ARB's or increasing Farxiga dosage based on next lab  Patient agreeable to plan  Past Medical History:   Diagnosis Date    Cataract 2021    Diabetes mellitus (Multi) 2005    GERD (gastroesophageal reflux disease) 2015    Glaucoma 2018      Past Surgical History:   Procedure Laterality Date    WISDOM TOOTH EXTRACTION  1980      Family History   Problem Relation Name Age of Onset    Diabetes Mother Lina Warner     Hypertension Mother Lina Warner     Cancer Father Sabas Warner     Hypertension Father Sabas Warner     Heart disease Father Sabas Warner     Ovarian cancer Cousin 2nd cousin 80    Diabetes Maternal Grandfather Maricruz Drummond     Diabetes Sister Sister       Allergies   Allergen Reactions    Iodinated Contrast Media Rash and Hives    Iodine Rash and Wheezing     Other reaction(s): Unknown    Penicillins Rash and Hives    Sulfa (Sulfonamide Antibiotics) Hives, Itching, Rash and Unknown     A    Monosodium Glutamate Other     Dizziness. 6/16/15.bw    Other reaction(s): Other   Dizziness. 6/16/15.bw    Amoxicillin Unknown          Occupation:     Review of Systems   Constitutional:  Negative for activity change and fever.   HENT:  Negative for congestion.    Respiratory:  Negative for cough, shortness of  "breath and wheezing.    Cardiovascular:  Negative for chest pain and leg swelling.   Gastrointestinal:  Negative for abdominal pain, constipation, nausea and vomiting.   Endocrine: Negative for cold intolerance.   Genitourinary:  Negative for dysuria, hematuria and urgency.   Neurological:  Negative for dizziness, speech difficulty, weakness and numbness.   Psychiatric/Behavioral:  Negative for self-injury and suicidal ideas.        Objective   Visit Vitals  /76 (BP Location: Left arm, Patient Position: Sitting, BP Cuff Size: Adult)   Pulse 63   Ht 1.473 m (4' 10\")   Wt (!) 41.5 kg (91 lb 9.6 oz)   SpO2 97%   BMI 19.14 kg/m²   OB Status Postmenopausal   Smoking Status Never   BSA 1.3 m²      Physical Exam  Constitutional:       Appearance: Normal appearance.   HENT:      Head: Normocephalic and atraumatic.      Nose: Nose normal.      Mouth/Throat:      Mouth: Mucous membranes are moist.   Eyes:      Conjunctiva/sclera: Conjunctivae normal.      Pupils: Pupils are equal, round, and reactive to light.   Cardiovascular:      Rate and Rhythm: Normal rate and regular rhythm.      Pulses: Normal pulses.      Heart sounds: Normal heart sounds.   Pulmonary:      Effort: Pulmonary effort is normal.      Breath sounds: Normal breath sounds.   Musculoskeletal:         General: Normal range of motion.      Cervical back: Neck supple.   Skin:     General: Skin is warm.   Neurological:      General: No focal deficit present.      Mental Status: She is alert and oriented to person, place, and time.   Psychiatric:         Mood and Affect: Mood normal.         Behavior: Behavior normal.         Thought Content: Thought content normal.         Judgment: Judgment normal.         Assessment/Plan   Diagnoses and all orders for this visit:  Leukopenia, unspecified type  -     Referral To Hematology and Oncology; Future  Type 2 diabetes mellitus without complication, without long-term current use of insulin  -     " Albumin-Creatinine Ratio, Urine Random; Future  -     Basic metabolic panel; Future  Hyperlipidemia LDL goal <100

## 2025-04-25 ENCOUNTER — HOSPITAL ENCOUNTER (OUTPATIENT)
Dept: RADIOLOGY | Facility: CLINIC | Age: 70
Discharge: HOME | End: 2025-04-25
Payer: MEDICARE

## 2025-04-25 ENCOUNTER — OFFICE VISIT (OUTPATIENT)
Dept: URGENT CARE | Age: 70
End: 2025-04-25
Payer: MEDICARE

## 2025-04-25 VITALS
DIASTOLIC BLOOD PRESSURE: 72 MMHG | WEIGHT: 91 LBS | TEMPERATURE: 97.9 F | SYSTOLIC BLOOD PRESSURE: 127 MMHG | OXYGEN SATURATION: 97 % | HEART RATE: 61 BPM | BODY MASS INDEX: 19.02 KG/M2 | RESPIRATION RATE: 18 BRPM

## 2025-04-25 DIAGNOSIS — M25.562 ACUTE PAIN OF LEFT KNEE: ICD-10-CM

## 2025-04-25 DIAGNOSIS — M25.562 ACUTE PAIN OF LEFT KNEE: Primary | ICD-10-CM

## 2025-04-25 LAB
ALBUMIN/CREAT UR: 22 MG/G CREAT
ANION GAP SERPL CALCULATED.4IONS-SCNC: 9 MMOL/L (CALC) (ref 7–17)
BUN SERPL-MCNC: 24 MG/DL (ref 7–25)
BUN/CREAT SERPL: NORMAL (CALC) (ref 6–22)
CALCIUM SERPL-MCNC: 9 MG/DL (ref 8.6–10.4)
CHLORIDE SERPL-SCNC: 103 MMOL/L (ref 98–110)
CO2 SERPL-SCNC: 25 MMOL/L (ref 20–32)
CREAT SERPL-MCNC: 0.76 MG/DL (ref 0.6–1)
CREAT UR-MCNC: 37 MG/DL (ref 20–275)
EGFRCR SERPLBLD CKD-EPI 2021: 84 ML/MIN/1.73M2
GLUCOSE SERPL-MCNC: 96 MG/DL (ref 65–99)
MICROALBUMIN UR-MCNC: 0.8 MG/DL
POTASSIUM SERPL-SCNC: 4.7 MMOL/L (ref 3.5–5.3)
SODIUM SERPL-SCNC: 137 MMOL/L (ref 135–146)

## 2025-04-25 PROCEDURE — 73564 X-RAY EXAM KNEE 4 OR MORE: CPT | Mod: LT

## 2025-04-25 RX ORDER — CELECOXIB 200 MG/1
200 CAPSULE ORAL 2 TIMES DAILY PRN
Qty: 7 CAPSULE | Refills: 0 | Status: SHIPPED | OUTPATIENT
Start: 2025-04-25 | End: 2025-05-02

## 2025-04-25 ASSESSMENT — ENCOUNTER SYMPTOMS
GASTROINTESTINAL NEGATIVE: 1
PSYCHIATRIC NEGATIVE: 1
HEMATOLOGIC/LYMPHATIC NEGATIVE: 1
ENDOCRINE NEGATIVE: 1
EYES NEGATIVE: 1
CARDIOVASCULAR NEGATIVE: 1
CONSTITUTIONAL NEGATIVE: 1
RESPIRATORY NEGATIVE: 1
ALLERGIC/IMMUNOLOGIC NEGATIVE: 1
ARTHRALGIAS: 1

## 2025-04-25 NOTE — PROGRESS NOTES
Subjective   Patient ID: Nazia Warner is a 70 y.o. female. They present today with a chief complaint of Injury (1 week - Left Knee injury 5/10 pain ).    History of Present Illness    History provided by:  Patient   used: No    Injury  Location:  Left knee pain, fell on left knee one week ago  Severity:  Moderate  Onset quality:  Sudden  Duration:  1 week  Timing:  Intermittent  Progression:  Waxing and waning  Chronicity:  New      Past Medical History  Allergies as of 04/25/2025 - Reviewed 04/25/2025   Allergen Reaction Noted    Iodinated contrast media Rash and Hives 10/10/2022    Iodine Rash and Wheezing 01/08/2020    Penicillins Rash and Hives 01/08/2020    Sulfa (sulfonamide antibiotics) Hives, Itching, Rash, and Unknown 01/08/2020    Monosodium glutamate Other 06/16/2015    Amoxicillin Unknown 04/25/2023       Prescriptions Prior to Admission[1]     Medical History[2]    Surgical History[3]     reports that she has never smoked. She has never used smokeless tobacco. She reports that she does not drink alcohol and does not use drugs.    Review of Systems  Review of Systems   Constitutional: Negative.    HENT: Negative.     Eyes: Negative.    Respiratory: Negative.     Cardiovascular: Negative.    Gastrointestinal: Negative.    Endocrine: Negative.    Genitourinary: Negative.    Musculoskeletal:  Positive for arthralgias and gait problem.   Allergic/Immunologic: Negative.    Hematological: Negative.    Psychiatric/Behavioral: Negative.                                    Objective    Vitals:    04/25/25 1229   BP: 127/72   Pulse: 61   Resp: 18   Temp: 36.6 °C (97.9 °F)   SpO2: 97%   Weight: (!) 41.3 kg (91 lb)     No LMP recorded. Patient is postmenopausal.    Physical Exam  Vitals and nursing note reviewed.   Constitutional:       Appearance: Normal appearance. She is normal weight.   HENT:      Nose: Nose normal.      Mouth/Throat:      Mouth: Mucous membranes are moist.   Cardiovascular:       Rate and Rhythm: Normal rate and regular rhythm.      Pulses: Normal pulses.      Heart sounds: Normal heart sounds.   Pulmonary:      Effort: Pulmonary effort is normal.      Breath sounds: Normal breath sounds.   Abdominal:      General: Bowel sounds are normal.      Palpations: Abdomen is soft.   Musculoskeletal:      Cervical back: Normal range of motion.      Left knee: Decreased range of motion. Tenderness present over the medial joint line and patellar tendon.   Skin:     General: Skin is warm and dry.   Neurological:      General: No focal deficit present.      Mental Status: She is alert. Mental status is at baseline.   Psychiatric:         Mood and Affect: Mood normal.         Behavior: Behavior normal.         Thought Content: Thought content normal.         Judgment: Judgment normal.         Procedures    Point of Care Test & Imaging Results from this visit  No results found for this visit on 04/25/25.   Imaging  No results found.    Cardiology, Vascular, and Other Imaging  No other imaging results found for the past 2 days      Diagnostic study results (if any) were reviewed by KESHAV Wasserman.    Assessment/Plan   Allergies, medications, history, and pertinent labs/EKGs/Imaging reviewed by KESHAV Wasserman.     Medical Decision Making  Medical Decision Making  At time of discharge patient was clinically well-appearing and HDS for outpatient management. The patient and/or family was educated regarding diagnosis, supportive care, OTC and Rx medications. The patient and/or family was given the opportunity to ask questions prior to discharge.  They verbalized understanding of my discussion of the plans for treatment, expected course, indications to return to  or seek further evaluation in ED, and the need for timely follow up as directed.   They were provided with a work/school excuse if requested.        Orders and Diagnoses  Diagnoses and all orders for this visit:  Acute  pain of left knee  -     celecoxib (CeleBREX) 200 mg capsule; Take 1 capsule (200 mg) by mouth 2 times a day as needed for moderate pain (4 - 6) (pain) for up to 7 days.  Supportive care, RICE, rest ice compression and elevation.    Return to Urgent care if symptoms return or progress  Follow up with PCP in 1-2 weeks   Patient disposition: Home    Electronically signed by KESHAV Wasserman  3:26 PM           [1] (Not in a hospital admission)  [2]   Past Medical History:  Diagnosis Date    Cataract 2021    Diabetes mellitus (Multi) 2005    GERD (gastroesophageal reflux disease) 2015    Glaucoma 2018   [3]   Past Surgical History:  Procedure Laterality Date    WISDOM TOOTH EXTRACTION  1980

## 2025-04-28 ENCOUNTER — APPOINTMENT (OUTPATIENT)
Dept: NUTRITION | Facility: CLINIC | Age: 70
End: 2025-04-28
Payer: MEDICARE

## 2025-04-28 VITALS — BODY MASS INDEX: 19.25 KG/M2 | WEIGHT: 91.7 LBS | HEIGHT: 58 IN

## 2025-04-28 DIAGNOSIS — E11.9 TYPE 2 DIABETES MELLITUS WITHOUT COMPLICATION, WITHOUT LONG-TERM CURRENT USE OF INSULIN: ICD-10-CM

## 2025-04-28 PROCEDURE — 97802 MEDICAL NUTRITION INDIV IN: CPT | Performed by: DIETITIAN, REGISTERED

## 2025-04-28 NOTE — PATIENT INSTRUCTIONS
For individuals with diabetes, maintaining a consistent carbohydrate intake at each meal and snack is crucial for effective blood sugar management. This approach, known as the consistent carbohydrate diet (CCHO), helps prevent large fluctuations in blood glucose levels, which can be beneficial for managing diabetes. Benefits of Consistent Carbohydrate Intake: Improved Blood Sugar Control: Consistently eating the same amount of carbohydrates at each meal and snack can help stabilize blood sugar levels, reducing the risk of both high and low blood sugar episodes.     Simplified Blood Sugar Monitoring: Consistent carb intake can make it easier to predict how your blood sugar will respond to meals and snacks, simplifying blood sugar monitoring and adjusting insulin dosages (if applicable).     Potential for Weight Management: By managing carb intake and preventing spikes and dips in blood sugar, you can potentially support weight management efforts.     1) Aim for three meals and 2 snacks per day. Strive to eat breakfast within 1 -2 hours of waking to jump start the metabolism, prevent muscle loss, and stabilize blood glucose. Aim for breakfast at 8:00, lunch at noon to 1:00, snack at 4:00 pm,  dinner at 6:00-7:00 and a bed time snack at 10:00 pm. Eating more on a routine can even help sleep overnight.   2) Strive to include protein in the meals and even snacks. Protein in meals can increase metabolism too.  Protein in snacks can sustain energy, stabilize blood glucose, and provide more contentment. Protein foods include eggs, egg whites, cheese, nuts, nut butters, Greek yogurt, poultry, meat, fish, tofu, plant-based protein powder, and/or plant-based protein drinks.      3) Aim for a consistent carbohydrate intake with aim to work towards 45 grams of CHO at meals and 15 grams at snacks. Eating the right amount of carbohydrates can provide the mind and body with energy.     Educational Handouts/Practices: [facility]'s CHO  counting guide  Next Session: Plate Method     Kat Willis, MS, RDN, LD, LACEY, MB-EAT-P   Advanced Practice Clinical Dietitian   Mindfulness-Based Eating Awareness Training Practitioner   [facility]   [facility] [place]   [email or web address]   Scheduling Line [phone]   Direct Line [phone]

## 2025-04-28 NOTE — PROGRESS NOTES
Reason for Nutrition Visit:  Pt is a 70 y.o. female being seen at referred for diabetes by Grace Kenney MD effective 01/28/25.  1. Type 2 diabetes mellitus without complication, without long-term current use of insulin  Referral to Nutrition Services         Past Medical Hx:  Problem List[1]   Patient Active Problem List  as of 4/28/2025 1:01 PM      Diagnosis    Brain lesion    GERD (gastroesophageal reflux disease)    Incontinence    Type 2 diabetes mellitus without complication    Vertiginous syndrome    Vertigo    Nonallergic vasomotor rhinitis    Tinnitus of both ears    Abnormal otoacoustic emissions test    Hypercholesterolemia    Protein-calorie malnutrition, unspecified severity (Multi)    Bilateral tinnitus    Mixed incontinence    Dry eyes       Current Medications[2]   urrent Medications  as of 4/28/2025 1:01 PM     Current Outpatient Medications:     azelastine (Astelin) 137 mcg (0.1 %) nasal spray, every 12 hours., Disp: , Rfl:     betamethasone dipropionate (Diprosone) 0.05 % lotion, APPLY TO THE AFFECTED AREA ON SCALP ONCE DAILY, Disp: , Rfl:     celecoxib (CeleBREX) 200 mg capsule, Take 1 capsule (200 mg) by mouth 2 times a day as needed for moderate pain (4 - 6) (pain) for up to 7 days., Disp: 7 capsule, Rfl: 0    cetirizine (ZyrTEC) 10 mg tablet, 1 (one) time each day at the same time, Disp: , Rfl:     clobetasol (Temovate) 0.05 % external solution, Apply twice daily to area on scalp, Disp: 50 mL, Rfl: 3    co-enzyme Q-10 30 mg capsule, Take 2 capsules (60 mg) by mouth once daily., Disp: , Rfl:     dapagliflozin propanediol (Farxiga) 5 mg, Take 1 tablet (5 mg) by mouth once daily in the morning., Disp: 90 tablet, Rfl: 2    diclofenac sodium (Voltaren) 1 % gel, APPLY TO FEET FOR PAIN RELIEF, Disp: , Rfl:     hydrOXYzine pamoate (Vistaril) 25 mg capsule, Take 1 capsule (25 mg) by mouth as needed at bedtime for itching for up to 15 days., Disp: 15 capsule, Rfl: 0    ibuprofen 800 mg  "tablet, TAKE 1 TABLET BY MOUTH THREE TIMES DAILY OR AS NEEDED FOR PAIN, Disp: , Rfl:     meclizine (Antivert) 25 mg tablet, Take 0.5 tablets (12.5 mg) by mouth every 8 hours if needed for dizziness., Disp: , Rfl:     metFORMIN XR (Glucophage-XR) 500 mg 24 hr tablet, Take 1 tablet (500 mg) by mouth 3 times a day. Take 1 tab in the morning and 2 tabs in the evening, Disp: , Rfl:     minoxidil (Loniten) 10 mg tablet, CRUSH 4 TABLETS INTO BETAMETHASONE LOTION AND APPLY TO THE AFFECTED AREA ON SCALP EVERY DAY. DO NOT SWALLOW, Disp: , Rfl:     montelukast (Singulair) 10 mg tablet, Take 1 tablet (10 mg) by mouth once daily at bedtime., Disp: , Rfl:     Nitro-Bid 2 % ointment, APPLY A PEA SIZED AMOUNT TO TOP OF FEET ONCE DAILY. WEAR GLOVES FOR APPLICATION., Disp: , Rfl:     OneTouch Delica Plus Lancet 30 gauge misc, USE AS DIRECTED TO CHECK BLOOD SUGAR TWICE DAILY, Disp: , Rfl:     OneTouch Verio Flex meter misc, USE AS DIRECTED TO CHECK BLOOD SUGAR TWICE DAILY, Disp: , Rfl:     oxybutynin XL (Ditropan-XL) 5 mg 24 hr tablet, Take 1 tablet (5 mg) by mouth once daily., Disp: 90 tablet, Rfl: 0    simvastatin (Zocor) 20 mg tablet, , Disp: , Rfl:     sodium,potassium,mag sulfates (Suprep) 17.5-3.13-1.6 gram solution, Take one bottle beginning at 6pm night before procedure and then take the other bottle 5 hours before procedure time as directed per instruction sheet, Disp: 1 each, Rfl: 0    solifenacin (VESIcare) 5 mg tablet, Take 1 tablet (5 mg) by mouth once daily. Swallow tablet whole; do not crush, chew, or split., Disp: , Rfl:     timolol (Timoptic) 0.5 % ophthalmic solution, INSTILL 1 DROP IN BOTH EYES TWICE DAILY, Disp: , Rfl:     triamcinolone (Kenalog) 0.1 % cream, Apply topically 2 times a day as needed for rash., Disp: 80 g, Rfl: 1    valACYclovir (Valtrex) 500 mg tab    Anthropometrics:  Anthropometrics  Height: 147.3 cm (4' 9.99\")  Weight: (!) 41.6 kg (91 lb 11.2 oz)  BMI (Calculated): 19.17   Weight change:  "   Significant Weight Change: No  Pt states she has been this weight for the past several years.  04/28/25 Weight: 91.7 lbs     Lab Results   Component Value Date    HGBA1C 6.7 (H) 04/10/2025    HGBA1C 7.4 (A) 04/27/2023    CHOL 185 04/10/2025    TRIG 76 04/10/2025    HDL 84 04/10/2025          Chemistry    Lab Results   Component Value Date/Time     04/24/2025 0945    K 4.7 04/24/2025 0945     04/24/2025 0945    CO2 25 04/24/2025 0945    BUN 24 04/24/2025 0945    CREATININE 0.76 04/24/2025 0945    Lab Results   Component Value Date/Time    CALCIUM 9.0 04/24/2025 0945    ALKPHOS 47 04/10/2025 1112    AST 16 04/10/2025 1112    ALT 16 04/10/2025 1112    BILITOT 0.6 04/10/2025 1112        Food and Nutrition Hx:  Pt has had diabetes for the past 15 years. She is a care-giver. Her target range is between  mg/dl.  2 weeks using the CGM and she has 88% in range, 12% above target and 0% below range. A1c is at 6.7%. She is on Metformin and Farxiga. Her FBG has been 110-120 mg/dl. She does have a bedtime snack with protein she has lower blood glucose.   She just started to consume a protein drink (kcal 180, CHO 12, protein 20). She avoids rice as this spikes her blood glucose.   Pt wakes up at 6:15 am. 1 -2 melas are consumed per day.     24 Diet Recall:  Meal 1: Breakfast is skipped. She may have this at 10:30 am. She just started to consume a protein drink (kcal 180, CHO 12, protein 20)  Meal 2: Lunch is at 1:00 am to include 8 ounces of chicken with 2 cups of vegetables such as broccoli and other vegetables; she uses oil (kcal 600, protein 40)    Energy drops in the afternoon.   She can have a protein bar (kcal 160, protein 20)   Meal 3: Dinner is at 9:00 to include 2 cups of chicken stir lemons (kcal 600) or skipped.   Snacks: 5 crackers and a piece of cheese (kcal 160)  Beverages: 16 ounces of water per day.  Total calories is 1,100 calories and this is 91% of needs to maintain weight. Protein intake is  "adequate.     Allergies: iodized salt   Intolerance: None  Appetite: Fluctuates  Intake: >75%  GI Symptoms : None   Swallowing Difficulty: No problems with swallowing  Dentition : own    Types of Activities: Walking  Walking 30-60 minutes 2 times a week.     Sleep duration/quality : 5-6 hours and disrupted sleep  Sleep disorders: none    Supplements: Multivitamin, Calcium, Fish Oil, and MSN and flaxseed  daily. She also takes vitamin D.     Energy Levels: Fluctuates    Food Preparation: Patient  Cooking Skills/Barriers: None reported  Grocery Shopping: Patient    Eating Out Type: Fast Food, Restaurant, and Take Out  3-5 times per week    Nutrition Focused Physical Exam:  Subcutaneous Fat Loss  Orbital Fat Pads: Mild-Moderate (slight dark circles and slight hollowing)  Buccal Fat Pads: Well nourished (full, rounded cheeks)  Triceps: Well nourished (ample fat tissue)  Ribs: Well nourished (chest is full, ribs do not show, slight to no protrusion of the iliac crest)  Muscle Wasting  Temporalis: Mild-Moderate (slight depression)  Pectoralis (Clavicular Region): Mild-Moderate (some protrusion of clavicle)  Deltoid/Trapezius: Well nourished (rounded appearance at arm, shoulder, neck)  Interosseous: Mild-Moderate (slightly depressed area between thumb and forefinger)  Trapezius/Infraspinatus/Supraspinatus (Scapular Region): Well nourished (bones not prominent, muscle taut)  Quadriceps: Well nourished (well developed, well rounded)  Gastrocnemius: Well nourished (well developed bulbous muscle)  Edema  Edema: none  Physical Findings  Hair: Positive (loss)  Eyes: Negative  Nails: Positive (central ridges)  Skin: Positive (dryness)    Estimated Energy Needs:  Energy Needs  Calculated Energy Needs Using Equations  Height: 147.3 cm (4' 9.99\")  Estimated Energy Needs  Total Energy Estimated Needs in 24 hours (kCal): 1248 kCal  Energy Estimated Needs per kg Body Weight in 24 hours (kCal/kg): 30 kCal/kg  Method for Estimating " Needs: Use actual weight to calculate needs.  Estimated Protein Needs  Total Protein Estimated Needs in 24 Hours (g): 56 g  Method for Estimating 24 Hour Protein Needs: minimal DRI for protein.     Nutrition Diagnosis:  Nutrition Diagnosis     Patient has Nutrition Diagnosis Yes   Diagnosis Status (1) New   Nutrition Diagnosis 1 Food and nutrition related knowledge deficit   Related to (1) how to eat to nourish the mind and body with diabetes   As Evidenced by (1) reports by pt of the need to learn.   Additional Nutrition Diagnosis Diagnosis 2   Diagnosis Status (2) New   Nutrition Diagnosis 2 Inadequate carbohydrate intake   Related to (2) reduce intake as a way to keep blood glucose in target since energy is not sustained and sleep is compromised   As Evidenced by (2) CHO intake does not meet daily minimal needs.     Nutrition Interventions/Recommendations:  Medical nutrition therapy was given for diabetes.     Nutrition Prescription:  1,248 calories per day to slightly increase weight to ideal body weight. Continue to consume adequate protein. CHO consistent meal plan with aim to work towards 45 grams of CHO at meals and 15 grams at snacks.     Food and Nutrition Delivery     Meals & Snacks Carbohydrate-modified diet   Goals For individuals with diabetes, maintaining a consistent carbohydrate intake at each meal and snack is crucial for effective blood sugar management. This approach, known as the consistent carbohydrate diet (CCHO), helps prevent large fluctuations in blood glucose levels, which can be beneficial for managing diabetes. Benefits of Consistent Carbohydrate Intake: Improved Blood Sugar Control: Consistently eating the same amount of carbohydrates at each meal and snack can help stabilize blood sugar levels, reducing the risk of both high and low blood sugar episodes. Simplified Blood Sugar Monitoring: Consistent carb intake can make it easier to predict how your blood sugar will respond to meals  and snacks, simplifying blood sugar monitoring and adjusting insulin dosages (if applicable). Potential for Weight Management: By managing carb intake and preventing spikes and dips in blood sugar, you can potentially support weight management efforts.   Nutrition Counseling     Nutrition Counseling Strategies Nutrition counseling based on motivational interviewing strategy; Nutrition counseling based on goal setting strategy     Nutrition Monitoring and Evaluations:    Food and Nutrient Intake     Monitoring and Evaluation Plan Meal/snack pattern; Carbohydrate intake   Meal/Snack Pattern Estimated meal and snack pattern   Estimated carbohydrate intake Estimated carbohydrate intake   Knowledge Belief Attitude Determination     Monitoring and Evaluation Plan Food and nutrition knowledge   Criteria ability to nourish the mind and body   Anthropometric measurements     Monitoring and Evaluation Plan Weight   Biochemical Data, Medical Tests and Procedures     Monitoring and Evaluation Plan Glucose/endocrine profile   Glucose/Endocrine Profile Hemoglobin A1c (HgbA1c)     Nutrition Goals:  Via teach back method patient verbalized understanding of the following topics:  1) Aim for three meals and 2 snacks per day. Strive to eat breakfast within 1 -2 hours of waking to jump start the metabolism, prevent muscle loss, and stabilize blood glucose. Aim for breakfast at 8:00, lunch at noon to 1:00, snack at 4:00 pm,  dinner at 6:00-7:00 and a bed time snack at 10:00 pm. Eating more on a routine can even help sleep overnight.   2) Strive to include protein in the meals and even snacks. Protein in meals can increase metabolism too.  Protein in snacks can sustain energy, stabilize blood glucose, and provide more contentment. Protein foods include eggs, egg whites, cheese, nuts, nut butters, Greek yogurt, poultry, meat, fish, tofu, plant-based protein powder, and/or plant-based protein drinks.      3) Aim for a consistent  carbohydrate intake with aim to work towards 45 grams of CHO at meals and 15 grams at snacks. Eating the right amount of carbohydrates can provide the mind and body with energy.     Educational Handouts/Practices: [facility]'s CHO counting guide  Next Session: Plate Method     Kat Willis, MS, RDN, LD, FAND, MB-EAT-P   Advanced Practice Clinical Dietitian   Mindfulness-Based Eating Awareness Training Practitioner   [facility]   [facility] [place]   [email or web address]   Scheduling Line [phone]   Direct Line [phone]      Readiness to Change : Good  Level of Understanding : Good  Anticipated Compliant : Good       [1]   Patient Active Problem List  Diagnosis    Brain lesion    GERD (gastroesophageal reflux disease)    Incontinence    Type 2 diabetes mellitus without complication    Vertiginous syndrome    Vertigo    Nonallergic vasomotor rhinitis    Tinnitus of both ears    Abnormal otoacoustic emissions test    Hypercholesterolemia    Protein-calorie malnutrition, unspecified severity (Multi)    Bilateral tinnitus    Mixed incontinence    Dry eyes   [2]   Current Outpatient Medications:     azelastine (Astelin) 137 mcg (0.1 %) nasal spray, every 12 hours., Disp: , Rfl:     betamethasone dipropionate (Diprosone) 0.05 % lotion, APPLY TO THE AFFECTED AREA ON SCALP ONCE DAILY, Disp: , Rfl:     celecoxib (CeleBREX) 200 mg capsule, Take 1 capsule (200 mg) by mouth 2 times a day as needed for moderate pain (4 - 6) (pain) for up to 7 days., Disp: 7 capsule, Rfl: 0    cetirizine (ZyrTEC) 10 mg tablet, 1 (one) time each day at the same time, Disp: , Rfl:     clobetasol (Temovate) 0.05 % external solution, Apply twice daily to area on scalp, Disp: 50 mL, Rfl: 3    co-enzyme Q-10 30 mg capsule, Take 2 capsules (60 mg) by mouth once daily., Disp: , Rfl:     dapagliflozin propanediol (Farxiga) 5 mg, Take 1 tablet (5 mg) by mouth once daily in the morning., Disp: 90  tablet, Rfl: 2    diclofenac sodium (Voltaren) 1 % gel, APPLY TO FEET FOR PAIN RELIEF, Disp: , Rfl:     hydrOXYzine pamoate (Vistaril) 25 mg capsule, Take 1 capsule (25 mg) by mouth as needed at bedtime for itching for up to 15 days., Disp: 15 capsule, Rfl: 0    ibuprofen 800 mg tablet, TAKE 1 TABLET BY MOUTH THREE TIMES DAILY OR AS NEEDED FOR PAIN, Disp: , Rfl:     meclizine (Antivert) 25 mg tablet, Take 0.5 tablets (12.5 mg) by mouth every 8 hours if needed for dizziness., Disp: , Rfl:     metFORMIN XR (Glucophage-XR) 500 mg 24 hr tablet, Take 1 tablet (500 mg) by mouth 3 times a day. Take 1 tab in the morning and 2 tabs in the evening, Disp: , Rfl:     minoxidil (Loniten) 10 mg tablet, CRUSH 4 TABLETS INTO BETAMETHASONE LOTION AND APPLY TO THE AFFECTED AREA ON SCALP EVERY DAY. DO NOT SWALLOW, Disp: , Rfl:     montelukast (Singulair) 10 mg tablet, Take 1 tablet (10 mg) by mouth once daily at bedtime., Disp: , Rfl:     Nitro-Bid 2 % ointment, APPLY A PEA SIZED AMOUNT TO TOP OF FEET ONCE DAILY. WEAR GLOVES FOR APPLICATION., Disp: , Rfl:     OneTouch Delica Plus Lancet 30 gauge misc, USE AS DIRECTED TO CHECK BLOOD SUGAR TWICE DAILY, Disp: , Rfl:     OneTouch Verio Flex meter misc, USE AS DIRECTED TO CHECK BLOOD SUGAR TWICE DAILY, Disp: , Rfl:     oxybutynin XL (Ditropan-XL) 5 mg 24 hr tablet, Take 1 tablet (5 mg) by mouth once daily., Disp: 90 tablet, Rfl: 0    simvastatin (Zocor) 20 mg tablet, , Disp: , Rfl:     sodium,potassium,mag sulfates (Suprep) 17.5-3.13-1.6 gram solution, Take one bottle beginning at 6pm night before procedure and then take the other bottle 5 hours before procedure time as directed per instruction sheet, Disp: 1 each, Rfl: 0    solifenacin (VESIcare) 5 mg tablet, Take 1 tablet (5 mg) by mouth once daily. Swallow tablet whole; do not crush, chew, or split., Disp: , Rfl:     timolol (Timoptic) 0.5 % ophthalmic solution, INSTILL 1 DROP IN BOTH EYES TWICE DAILY, Disp: , Rfl:     triamcinolone  (Kenalog) 0.1 % cream, Apply topically 2 times a day as needed for rash., Disp: 80 g, Rfl: 1    valACYclovir (Valtrex) 500 mg tablet, Take 2 tablets (1,000 mg) by mouth once daily as needed., Disp: , Rfl:

## 2025-04-29 ENCOUNTER — APPOINTMENT (OUTPATIENT)
Dept: PHYSICAL THERAPY | Facility: CLINIC | Age: 70
End: 2025-04-29
Payer: MEDICARE

## 2025-04-29 DIAGNOSIS — N39.41 URGE INCONTINENCE OF URINE: ICD-10-CM

## 2025-04-29 PROCEDURE — 97530 THERAPEUTIC ACTIVITIES: CPT | Mod: GP | Performed by: PHYSICAL THERAPIST

## 2025-04-29 PROCEDURE — 97110 THERAPEUTIC EXERCISES: CPT | Mod: GP | Performed by: PHYSICAL THERAPIST

## 2025-04-29 NOTE — PROGRESS NOTES
Physical Therapy    PELVIC FLOOR TREATMENT    Name: Nazia Warner  MRN: [id number]  : 1955  Today's Date: 25     Time Calculation  Start Time: 08  Stop Time: 0845  Time Calculation (min): 40 min       PT Therapeutic Procedures Time Entry  Therapeutic Exercise Time Entry: 30  Therapeutic Activity Time Entry: 10     Visit: 3   Insurance: [employer]  Auth: No Auth  $25 copay    Assessment:   Focused on table flexibility d/t knee discomfort   Overall, improvement in terms of frequency but still having urgency symptoms when she is out of the house     Plan:   Half kneeling flexibility as able  Lunge work with breath coordination  Review urgency control       Current Problem:  1. Urge incontinence of urine  Follow Up In Physical Therapy          Subjective   Pt report she is doing okay; did have a fall while she was out of town and hurt her L knee; hasn't been able to do regular squatting and quadruped rx d/t discomfort     Urgency symptoms still noted with environmental triggers     Objective     EXTERNAL OBSERVATION:  Voluntary Contraction: present   Voluntary Relaxation: incomplete  Involuntary Contraction: present  Involuntary Relaxation incomplete        INTERNAL VAGINAL EXAMINATION:  PFM Strength: 3/5      INTERNAL PALPATION:   Tenderness with palpation of bulbocavernosus, ischiocavernosus, levator ani group bilat   R more tenderness vs L   6/10 discomfort per pt    Tension noted throughout pelvic floor with difficulty with eccentric lengthening following Kegel contraction       EXTERNAL PALPATION:  Levator Ani: No Pain/Tightness R, no Pain/Tightness L  Bulbo: no Pain/Tightness R, no Pain/Tightness L  Ischiocavernosus: no Pain/Tightness R, no Pain/Tightness L  Transverse perineum: no Pain/Tightness R, no Pain/Tightness L      TREATMENT  Therapeutic activity:  Review urgency control techniques for reduced trigger with environmental factors  Review bladder norms; 2 hr lexy without leakage is  improvement       Therapeutic exercise:  Butterfly stretch x 1 min hold ea   Happy baby stretch x 1 min hold   Figure 4 stretch x 1 min  Reverse clamshells 2 x 10 ea    Lumbar rot x 10   Calf stretch on tilt board 3 x 10 sec hold   Roll out stretch for L foot x 1 min     Not today:   Cat cow x 10   Child's pose stretch x 10 with 3 hold      Careplan Goals:  Problem: PT Problem       Goal: Pt will be independent in urge control techniques for reduced UUI          Goal: Pt will demonstrate improved eccentric lengthening of pelvic floor for improved pressure management and improved bladder control           Goal: Pt will be independent in HEP for home maintenance            Pedro Burns, PT

## 2025-05-06 ENCOUNTER — TREATMENT (OUTPATIENT)
Dept: PHYSICAL THERAPY | Facility: CLINIC | Age: 70
End: 2025-05-06
Payer: MEDICARE

## 2025-05-06 DIAGNOSIS — N39.41 URGE INCONTINENCE OF URINE: ICD-10-CM

## 2025-05-06 PROCEDURE — 97110 THERAPEUTIC EXERCISES: CPT | Mod: GP | Performed by: PHYSICAL THERAPIST

## 2025-05-06 PROCEDURE — 97530 THERAPEUTIC ACTIVITIES: CPT | Mod: GP | Performed by: PHYSICAL THERAPIST

## 2025-05-06 NOTE — PROGRESS NOTES
Physical Therapy    PELVIC FLOOR TREATMENT    Name: Nazia Warner  MRN: [id number]  : 1955  Today's Date: 25     Time Calculation  Start Time: 1050  Stop Time: 1130  Time Calculation (min): 40 min       PT Therapeutic Procedures Time Entry  Therapeutic Exercise Time Entry: 20  Therapeutic Activity Time Entry: 10     Visit: 4  Insurance: [place] [employer]  Auth: No Auth  $25 copay    Assessment:   Pt did well with table work. Discussed urgency control techniques to continue working on   Also discussed realistic goals of reduced frequency of leakage and reduced in terms of amount is improvement     Plan:   Half kneeling flexibility as able  Lunge work with breath coordination    Current Problem:  1. Urge incontinence of urine  Follow Up In Physical Therapy          Subjective   Pt reports knee is still bothering her   Unable to perform quadruped work d/t knee       Objective     EXTERNAL OBSERVATION:  Voluntary Contraction: present   Voluntary Relaxation: incomplete  Involuntary Contraction: present  Involuntary Relaxation incomplete        INTERNAL VAGINAL EXAMINATION:  PFM Strength: 3/5      INTERNAL PALPATION:   Tenderness with palpation of bulbocavernosus, ischiocavernosus, levator ani group bilat   R more tenderness vs L   6/10 discomfort per pt    Tension noted throughout pelvic floor with difficulty with eccentric lengthening following Kegel contraction       EXTERNAL PALPATION:  Levator Ani: No Pain/Tightness R, no Pain/Tightness L  Bulbo: no Pain/Tightness R, no Pain/Tightness L  Ischiocavernosus: no Pain/Tightness R, no Pain/Tightness L  Transverse perineum: no Pain/Tightness R, no Pain/Tightness L      TREATMENT  Therapeutic activity:  Review of urgency control; attempting Kegels while walking to the bathroom, stopping, taking some deep breaths and then returning to move toward the bathroom       Therapeutic exercise:  Butterfly stretch x 1 min hold ea   Happy baby stretch x 1 min hold   Figure 4  stretch x 1 min  Lumbar rot x 10   Hip abd (black) x 20   Reverse clamshells 2 x 10 ea    Clamshells 2 x 10     Ice L knee x 10 min EOS    Not today:   Cat cow x 10   Child's pose stretch x 10 with 3 hold      Careplan Goals:  Problem: PT Problem       Goal: Pt will be independent in urge control techniques for reduced UUI          Goal: Pt will demonstrate improved eccentric lengthening of pelvic floor for improved pressure management and improved bladder control           Goal: Pt will be independent in HEP for home maintenance            Pedro Burns, PT

## 2025-05-13 ENCOUNTER — APPOINTMENT (OUTPATIENT)
Dept: PHYSICAL THERAPY | Facility: CLINIC | Age: 70
End: 2025-05-13
Payer: MEDICARE

## 2025-05-13 DIAGNOSIS — N39.41 URGE INCONTINENCE OF URINE: ICD-10-CM

## 2025-05-13 PROCEDURE — 97110 THERAPEUTIC EXERCISES: CPT | Mod: GP | Performed by: PHYSICAL THERAPIST

## 2025-05-13 NOTE — PROGRESS NOTES
Physical Therapy    PELVIC FLOOR TREATMENT    Name: Nazia Warner  MRN: [id number]  : 1955  Today's Date: 25     Time Calculation  Start Time: 1100  Stop Time: 1145  Time Calculation (min): 45 min       PT Therapeutic Procedures Time Entry  Therapeutic Exercise Time Entry: 30     Visit: 5  Insurance: [employer]  Auth: No Auth  $25 copay    Assessment:   Was able to progress into standing work; difficulty with lateral lunge d/t continued knee pain   No pain with reverse lunge       Plan:   Half kneeling flexibility as able      Current Problem:  1. Urge incontinence of urine  Follow Up In Physical Therapy          Subjective   Still working on holding it with urgency/environmental triggers  Still happening about 80% of the time when on the way to the bathroom      Objective     EXTERNAL OBSERVATION:  Voluntary Contraction: present   Voluntary Relaxation: incomplete  Involuntary Contraction: present  Involuntary Relaxation incomplete        INTERNAL VAGINAL EXAMINATION:  PFM Strength: 3/5      INTERNAL PALPATION:   Tenderness with palpation of bulbocavernosus, ischiocavernosus, levator ani group bilat   R more tenderness vs L   6/10 discomfort per pt    Tension noted throughout pelvic floor with difficulty with eccentric lengthening following Kegel contraction       EXTERNAL PALPATION:  Levator Ani: No Pain/Tightness R, no Pain/Tightness L  Bulbo: no Pain/Tightness R, no Pain/Tightness L  Ischiocavernosus: no Pain/Tightness R, no Pain/Tightness L  Transverse perineum: no Pain/Tightness R, no Pain/Tightness L      TREATMENT  Therapeutic exercise:  Butterfly stretch x 1 min hold ea   Happy baby stretch x 1 min hold   Figure 4 stretch x 1 min  Lumbar rot x 10   Hip abd (black) x 20   Reverse clamshells 2 x 10 ea    Clamshells 2 x 10   Lateral lunge (small range) on slider x 10 ea   Reverse lunge (small range) on slider 2 x 10     Ice L knee x 10 min EOS    Not today:   Cat cow x 10   Child's pose stretch  x 10 with 3 hold      Careplan Goals:  Problem: PT Problem       Goal: Pt will be independent in urge control techniques for reduced UUI          Goal: Pt will demonstrate improved eccentric lengthening of pelvic floor for improved pressure management and improved bladder control           Goal: Pt will be independent in HEP for home maintenance            Pedro Burns, PT

## 2025-05-19 ENCOUNTER — HOSPITAL ENCOUNTER (OUTPATIENT)
Dept: RADIOLOGY | Facility: CLINIC | Age: 70
Discharge: HOME | End: 2025-05-19
Payer: MEDICARE

## 2025-05-19 ENCOUNTER — APPOINTMENT (OUTPATIENT)
Dept: RADIOLOGY | Facility: CLINIC | Age: 70
End: 2025-05-19
Payer: MEDICARE

## 2025-05-19 VITALS — HEIGHT: 58 IN | BODY MASS INDEX: 19.25 KG/M2 | WEIGHT: 91.71 LBS

## 2025-05-19 DIAGNOSIS — Z00.00 HEALTH CARE MAINTENANCE: ICD-10-CM

## 2025-05-19 DIAGNOSIS — Z12.31 ENCOUNTER FOR SCREENING MAMMOGRAM FOR MALIGNANT NEOPLASM OF BREAST: ICD-10-CM

## 2025-05-19 PROCEDURE — 77063 BREAST TOMOSYNTHESIS BI: CPT | Performed by: RADIOLOGY

## 2025-05-19 PROCEDURE — 77067 SCR MAMMO BI INCL CAD: CPT

## 2025-05-19 PROCEDURE — 77067 SCR MAMMO BI INCL CAD: CPT | Performed by: RADIOLOGY

## 2025-05-20 ENCOUNTER — TREATMENT (OUTPATIENT)
Dept: PHYSICAL THERAPY | Facility: CLINIC | Age: 70
End: 2025-05-20
Payer: MEDICARE

## 2025-05-20 DIAGNOSIS — N39.41 URGE INCONTINENCE OF URINE: ICD-10-CM

## 2025-05-20 PROCEDURE — 97110 THERAPEUTIC EXERCISES: CPT | Mod: GP | Performed by: PHYSICAL THERAPIST

## 2025-05-20 PROCEDURE — 97530 THERAPEUTIC ACTIVITIES: CPT | Mod: GP | Performed by: PHYSICAL THERAPIST

## 2025-06-02 ENCOUNTER — APPOINTMENT (OUTPATIENT)
Dept: NUTRITION | Facility: CLINIC | Age: 70
End: 2025-06-02
Payer: MEDICARE

## 2025-06-19 ENCOUNTER — DOCUMENTATION (OUTPATIENT)
Dept: PHYSICAL THERAPY | Facility: CLINIC | Age: 70
End: 2025-06-19

## 2025-06-19 ENCOUNTER — APPOINTMENT (OUTPATIENT)
Dept: PHYSICAL THERAPY | Facility: CLINIC | Age: 70
End: 2025-06-19
Payer: MEDICARE

## 2025-06-19 NOTE — PROGRESS NOTES
Physical Therapy                 Therapy Communication Note    Patient Name: Nazia Warner  MRN: [id number]  Department:   Room: Room/bed info not found  Today's Date: 6/19/2025     Discipline: Physical Therapy    Missed Visit:       Missed Visit Reason:      Missed Time: No Show    Comment:    No show treatment   First

## 2025-06-23 ENCOUNTER — APPOINTMENT (OUTPATIENT)
Dept: NUTRITION | Facility: CLINIC | Age: 70
End: 2025-06-23
Payer: MEDICARE

## 2025-06-23 VITALS — WEIGHT: 88.8 LBS | HEIGHT: 58 IN | BODY MASS INDEX: 18.64 KG/M2

## 2025-06-23 DIAGNOSIS — E11.9 TYPE 2 DIABETES MELLITUS WITHOUT COMPLICATION, WITHOUT LONG-TERM CURRENT USE OF INSULIN: Primary | Chronic | ICD-10-CM

## 2025-06-23 PROCEDURE — 97803 MED NUTRITION INDIV SUBSEQ: CPT | Performed by: DIETITIAN, REGISTERED

## 2025-06-23 NOTE — PROGRESS NOTES
Reason for Nutrition Visit:  Pt is a 70 y.o. female being seen at referred for diabetes by Grace Kenney MD effective 01/28/25.    1. Type 2 diabetes mellitus without complication, without long-term current use of insulin              Past Medical Hx:  Problem List[1]   Patient Active Problem List  as of 4/28/2025 1:01 PM      Diagnosis    Brain lesion    GERD (gastroesophageal reflux disease)    Incontinence    Type 2 diabetes mellitus without complication    Vertiginous syndrome    Vertigo    Nonallergic vasomotor rhinitis    Tinnitus of both ears    Abnormal otoacoustic emissions test    Hypercholesterolemia    Protein-calorie malnutrition, unspecified severity (Multi)    Bilateral tinnitus    Mixed incontinence    Dry eyes       Current Medications[2]   urrent Medications  as of 4/28/2025 1:01 PM     Current Outpatient Medications:     azelastine (Astelin) 137 mcg (0.1 %) nasal spray, every 12 hours., Disp: , Rfl:     betamethasone dipropionate (Diprosone) 0.05 % lotion, APPLY TO THE AFFECTED AREA ON SCALP ONCE DAILY, Disp: , Rfl:     celecoxib (CeleBREX) 200 mg capsule, Take 1 capsule (200 mg) by mouth 2 times a day as needed for moderate pain (4 - 6) (pain) for up to 7 days., Disp: 7 capsule, Rfl: 0    cetirizine (ZyrTEC) 10 mg tablet, 1 (one) time each day at the same time, Disp: , Rfl:     clobetasol (Temovate) 0.05 % external solution, Apply twice daily to area on scalp, Disp: 50 mL, Rfl: 3    co-enzyme Q-10 30 mg capsule, Take 2 capsules (60 mg) by mouth once daily., Disp: , Rfl:     dapagliflozin propanediol (Farxiga) 5 mg, Take 1 tablet (5 mg) by mouth once daily in the morning., Disp: 90 tablet, Rfl: 2    diclofenac sodium (Voltaren) 1 % gel, APPLY TO FEET FOR PAIN RELIEF, Disp: , Rfl:     hydrOXYzine pamoate (Vistaril) 25 mg capsule, Take 1 capsule (25 mg) by mouth as needed at bedtime for itching for up to 15 days., Disp: 15 capsule, Rfl: 0    ibuprofen 800 mg tablet, TAKE 1 TABLET BY  "MOUTH THREE TIMES DAILY OR AS NEEDED FOR PAIN, Disp: , Rfl:     meclizine (Antivert) 25 mg tablet, Take 0.5 tablets (12.5 mg) by mouth every 8 hours if needed for dizziness., Disp: , Rfl:     metFORMIN XR (Glucophage-XR) 500 mg 24 hr tablet, Take 1 tablet (500 mg) by mouth 3 times a day. Take 1 tab in the morning and 2 tabs in the evening, Disp: , Rfl:     minoxidil (Loniten) 10 mg tablet, CRUSH 4 TABLETS INTO BETAMETHASONE LOTION AND APPLY TO THE AFFECTED AREA ON SCALP EVERY DAY. DO NOT SWALLOW, Disp: , Rfl:     montelukast (Singulair) 10 mg tablet, Take 1 tablet (10 mg) by mouth once daily at bedtime., Disp: , Rfl:     Nitro-Bid 2 % ointment, APPLY A PEA SIZED AMOUNT TO TOP OF FEET ONCE DAILY. WEAR GLOVES FOR APPLICATION., Disp: , Rfl:     OneTouch Delica Plus Lancet 30 gauge misc, USE AS DIRECTED TO CHECK BLOOD SUGAR TWICE DAILY, Disp: , Rfl:     OneTouch Verio Flex meter misc, USE AS DIRECTED TO CHECK BLOOD SUGAR TWICE DAILY, Disp: , Rfl:     oxybutynin XL (Ditropan-XL) 5 mg 24 hr tablet, Take 1 tablet (5 mg) by mouth once daily., Disp: 90 tablet, Rfl: 0    simvastatin (Zocor) 20 mg tablet, , Disp: , Rfl:     sodium,potassium,mag sulfates (Suprep) 17.5-3.13-1.6 gram solution, Take one bottle beginning at 6pm night before procedure and then take the other bottle 5 hours before procedure time as directed per instruction sheet, Disp: 1 each, Rfl: 0    solifenacin (VESIcare) 5 mg tablet, Take 1 tablet (5 mg) by mouth once daily. Swallow tablet whole; do not crush, chew, or split., Disp: , Rfl:     timolol (Timoptic) 0.5 % ophthalmic solution, INSTILL 1 DROP IN BOTH EYES TWICE DAILY, Disp: , Rfl:     triamcinolone (Kenalog) 0.1 % cream, Apply topically 2 times a day as needed for rash., Disp: 80 g, Rfl: 1    valACYclovir (Valtrex) 500 mg tab    Anthropometrics:  Anthropometrics  Height: 147.3 cm (4' 9.99\")  Weight: (!) 40.3 kg (88 lb 12.8 oz)  BMI (Calculated): 18.56   Weight change:    Significant Weight Change: " No  Pt states she has been this weight for the past several years.  04/28/25 Weight: 91.7 lbs   06/2025 Weight: 88.8 lbs     Lab Results   Component Value Date    HGBA1C 6.7 (H) 04/10/2025    HGBA1C 7.4 (A) 04/27/2023    CHOL 185 04/10/2025    TRIG 76 04/10/2025    HDL 84 04/10/2025          Chemistry    Lab Results   Component Value Date/Time     04/24/2025 0945    K 4.7 04/24/2025 0945     04/24/2025 0945    CO2 25 04/24/2025 0945    BUN 24 04/24/2025 0945    CREATININE 0.76 04/24/2025 0945    Lab Results   Component Value Date/Time    CALCIUM 9.0 04/24/2025 0945    ALKPHOS 47 04/10/2025 1112    AST 16 04/10/2025 1112    ALT 16 04/10/2025 1112    BILITOT 0.6 04/10/2025 1112        Food and Nutrition Hx:  Pt has had diabetes for the past 15 years. She is a care-giver. Her target range is between  mg/dl.  2 weeks using the CGM and she has 88% in range, 12% above target and 0% below range. A1c is at 6.7%. She is on Metformin and Farxiga. Her FBG has been 110-120 mg/dl. She does have a bedtime snack with protein she has lower blood glucose.   She just started to consume a protein drink (kcal 180, CHO 12, protein 20). She avoids rice as this spikes her blood glucose.   Pt wakes up at 6:15 am. 1 -2 melas are consumed per day.     Pt had a follow-up appointment.   30 days using the CGM and she has 88% in range, 11% above target, 1% very high,  and 0% below range.  FBG has been 88-90  mg/dl.   Pt states she has been trying to consume a consistent carbohydrate intake. She notes her blood glucose can increase after consuming CHO.   She states it goes up to 250 mg/dl.     24 Diet Recall:  Meal 1: Breakfast is 8:00 to include a protein drink such as whey protein and almond milk (kcal 150, CHO 5)   Meal 2: Lunch is at 1:00 am to include include 1 cup of chicken gumbo 1 of rice (kcal 500, CHO 45)   Energy drops in the afternoon.   She can have a protein bar (kcal 160, protein 20)   Meal 3: Dinner is at 9:00 to  1 cup of chicken gumbo 1 of rice (kcal 500, CHO 45)   Snacks: 5 crackers and a piece of cheese (kcal 160) or peanut butter and 2 T (kcal 300, CHO 15)   Beverages: 16 ounces of water per day.  Total calories is 1,100 calories and this is 91% of needs to maintain weight; however if meals are skipped it will be less.     Allergies: iodized salt   Intolerance: None  Appetite: Fluctuates  Intake: >75%  GI Symptoms : None   Swallowing Difficulty: No problems with swallowing  Dentition : own    Types of Activities: Walking  Walking 30-60 minutes 2 times a week.     Sleep duration/quality : 5-6 hours and disrupted sleep  Sleep disorders: none    Supplements: Multivitamin, Calcium, Fish Oil, and MSN and flaxseed daily. She also takes vitamin D.     Energy Levels: Fluctuates    Food Preparation: Patient  Cooking Skills/Barriers: None reported  Grocery Shopping: Patient    Eating Out Type: Fast Food, Restaurant, and Take Out 3-5 times per week    Nutrition Focused Physical Exam:  Subcutaneous Fat Loss  Orbital Fat Pads: Mild-Moderate (slight dark circles and slight hollowing)  Buccal Fat Pads: Well nourished (full, rounded cheeks)  Triceps: Well nourished (ample fat tissue)  Ribs: Well nourished (chest is full, ribs do not show, slight to no protrusion of the iliac crest)  Muscle Wasting  Temporalis: Mild-Moderate (slight depression)  Pectoralis (Clavicular Region): Mild-Moderate (some protrusion of clavicle)  Deltoid/Trapezius: Well nourished (rounded appearance at arm, shoulder, neck)  Interosseous: Mild-Moderate (slightly depressed area between thumb and forefinger)  Trapezius/Infraspinatus/Supraspinatus (Scapular Region): Well nourished (bones not prominent, muscle taut)  Quadriceps: Well nourished (well developed, well rounded)  Gastrocnemius: Well nourished (well developed bulbous muscle)  Physical Findings  Hair: Positive (hair loss)  Eyes: Negative  Nails: Positive (central ridges)  Skin: Negative    Estimated Energy  "Needs:  Energy Needs  Calculated Energy Needs Using Equations  Height: 147.3 cm (4' 9.99\")  Estimated Energy Needs  Total Energy Estimated Needs in 24 hours (kCal): 1248 kCal  Energy Estimated Needs per kg Body Weight in 24 hours (kCal/kg): 30 kCal/kg  Method for Estimating Needs: Use actual weight to calculate needs.  Estimated Protein Needs  Total Protein Estimated Needs in 24 Hours (g): 56 g  Method for Estimating 24 Hour Protein Needs: minimal DRI for protein.     Nutrition Diagnosis:  Nutrition Diagnosis     Patient has Nutrition Diagnosis Yes   Diagnosis Status (1) Active    Nutrition Diagnosis 1 Food and nutrition related knowledge deficit   Related to (1) how to eat to nourish the mind and body with diabetes   As Evidenced by (1) reports by pt of the need to learn.   Additional Nutrition Diagnosis Diagnosis 2   Diagnosis Status (2) Active    Nutrition Diagnosis 2 Inadequate carbohydrate intake   Related to (2) reduce intake as a way to keep blood glucose in target since energy is not sustained and sleep is compromised   As Evidenced by (2) CHO intake does not meet daily minimal needs.     Nutrition Interventions/Recommendations:  Medical nutrition therapy was given for diabetes.     Nutrition Prescription:  1,248 calories per day to slightly increase weight to ideal body weight. Continue to consume adequate protein. CHO consistent meal plan with aim to work towards 45 grams of CHO at meals and 15 grams at snacks.   Nutrition Prescription     Individualized Nutrition Prescription Provided for Oral nutrition   Individualized Nutrition Prescription Provided for CHO consistent meal plan focused on weight regain.   Food and Nutrition Delivery     Meals & Snacks Carbohydrate-modified diet   Goals For individuals with diabetes, maintaining a consistent carbohydrate intake at each meal and snack is crucial for effective blood sugar management. This approach, known as the consistent carbohydrate diet (CCHO), helps " prevent large fluctuations in blood glucose levels, which can be beneficial for managing diabetes. Benefits of Consistent Carbohydrate Intake: Improved Blood Sugar Control: Consistently eating the same amount of carbohydrates at each meal and snack can help stabilize blood sugar levels, reducing the risk of both high and low blood sugar episodes. Simplified Blood Sugar Monitoring: Consistent carb intake can make it easier to predict how your blood sugar will respond to meals and snacks, simplifying blood sugar monitoring and adjusting insulin dosages (if applicable). Potential for Weight Management: By managing carb intake and preventing spikes and dips in blood sugar, you can potentially support weight management efforts.   Medical Food Supplement Commercial beverage medical food supplement therapy   Goals 1)Consider consuming an Oral Nutrition Supplement (ONS) to help ensure adequate nutrition. It is recommended to consume Boost Very High Calorie (kcal 530, CHO 52, protein 22) when needing a meal in a fast pace environment.   Nutrition Counseling     Nutrition Counseling Strategies Nutrition counseling based on motivational interviewing strategy; Nutrition counseling based on goal setting strategy         Nutrition Monitoring and Evaluations:    Food and Nutrient Intake     Monitoring and Evaluation Plan Meal/snack pattern; Carbohydrate intake   Meal/Snack Pattern Estimated meal and snack pattern   Estimated carbohydrate intake Estimated carbohydrate intake   Knowledge Belief Attitude Determination     Monitoring and Evaluation Plan Food and nutrition knowledge   Criteria 45 grams of CHO at meals   Anthropometric measurements     Monitoring and Evaluation Plan Weight   Body Weight Body weight   Biochemical Data, Medical Tests and Procedures     Monitoring and Evaluation Plan Glucose/endocrine profile   Glucose/Endocrine Profile Hemoglobin A1c (HgbA1c)     Nutrition Goals:  Via teach back method patient verbalized  understanding of the following topics:  1) Aim for three meals and 2 snacks per day. Strive to eat breakfast within 1 -2 hours of waking to jump start the metabolism, prevent muscle loss, and stabilize blood glucose. Aim for breakfast at 8:00, lunch at noon to 1:00, snack at 4:00 pm,  dinner at 6:00-7:00 and a bed time snack at 10:00 pm. Eating more on a routine can even help sleep overnight.   2) Strive to include protein in the meals and even snacks. Protein in meals can increase metabolism too.  Protein in snacks can sustain energy, stabilize blood glucose, and provide more contentment. Protein foods include eggs, egg whites, cheese, nuts, nut butters, Greek yogurt, poultry, meat, fish, tofu, plant-based protein powder, and/or plant-based protein drinks.      3) Aim for a consistent carbohydrate intake with aim to work towards 45 grams of CHO at meals and 15 grams at snacks.   4) Consider consuming an Oral Nutrition Supplement (ONS) to help ensure adequate nutrition. It is recommended to consume Boost Very High Calorie (kcal 530, CHO 52, protein 22) when needing a meal in a fast pace environment.    [facility]   Address: [address] [place] [address] Room [facility], [place] [address]   Phone: [phone]     Educational Handouts/Practices:   [facility]'s CHO counting guide  Next Session: Plate Method     Kat Willis, MS, RDN, LD, FANART, MB-EAT-P   Advanced Practice Clinical Dietitian   Mindfulness-Based Eating Awareness Training Practitioner   [facility]   [facility]   [email or web address]   Scheduling Line [phone]   Direct Line [phone]      Readiness to Change : Good  Level of Understanding : Good  Anticipated Compliant : Good         [1]   Patient Active Problem List  Diagnosis    Brain lesion    GERD (gastroesophageal reflux disease)    Incontinence    Type 2 diabetes mellitus without complication    Vertiginous syndrome    Vertigo     Nonallergic vasomotor rhinitis    Tinnitus of both ears    Abnormal otoacoustic emissions test    Hypercholesterolemia    Protein-calorie malnutrition, unspecified severity (Multi)    Bilateral tinnitus    Mixed incontinence    Dry eyes   [2]   Current Outpatient Medications:     azelastine (Astelin) 137 mcg (0.1 %) nasal spray, every 12 hours., Disp: , Rfl:     betamethasone dipropionate (Diprosone) 0.05 % lotion, APPLY TO THE AFFECTED AREA ON SCALP ONCE DAILY, Disp: , Rfl:     cetirizine (ZyrTEC) 10 mg tablet, 1 (one) time each day at the same time, Disp: , Rfl:     clobetasol (Temovate) 0.05 % external solution, Apply twice daily to area on scalp, Disp: 50 mL, Rfl: 3    co-enzyme Q-10 30 mg capsule, Take 2 capsules (60 mg) by mouth once daily., Disp: , Rfl:     dapagliflozin propanediol (Farxiga) 5 mg, Take 1 tablet (5 mg) by mouth once daily in the morning., Disp: 90 tablet, Rfl: 2    diclofenac sodium (Voltaren) 1 % gel, APPLY TO FEET FOR PAIN RELIEF, Disp: , Rfl:     hydrOXYzine pamoate (Vistaril) 25 mg capsule, Take 1 capsule (25 mg) by mouth as needed at bedtime for itching for up to 15 days., Disp: 15 capsule, Rfl: 0    ibuprofen 800 mg tablet, TAKE 1 TABLET BY MOUTH THREE TIMES DAILY OR AS NEEDED FOR PAIN, Disp: , Rfl:     meclizine (Antivert) 25 mg tablet, Take 0.5 tablets (12.5 mg) by mouth every 8 hours if needed for dizziness., Disp: , Rfl:     metFORMIN XR (Glucophage-XR) 500 mg 24 hr tablet, Take 1 tablet (500 mg) by mouth 3 times a day. Take 1 tab in the morning and 2 tabs in the evening, Disp: , Rfl:     minoxidil (Loniten) 10 mg tablet, CRUSH 4 TABLETS INTO BETAMETHASONE LOTION AND APPLY TO THE AFFECTED AREA ON SCALP EVERY DAY. DO NOT SWALLOW, Disp: , Rfl:     montelukast (Singulair) 10 mg tablet, Take 1 tablet (10 mg) by mouth once daily at bedtime., Disp: , Rfl:     Nitro-Bid 2 % ointment, APPLY A PEA SIZED AMOUNT TO TOP OF FEET ONCE DAILY. WEAR GLOVES FOR APPLICATION., Disp: , Rfl:      OneTouch Delica Plus Lancet 30 gauge misc, USE AS DIRECTED TO CHECK BLOOD SUGAR TWICE DAILY, Disp: , Rfl:     OneTouch Verio Flex meter [facility], USE AS DIRECTED TO CHECK BLOOD SUGAR TWICE DAILY, Disp: , Rfl:     oxybutynin XL (Ditropan-XL) 5 mg 24 hr tablet, Take 1 tablet (5 mg) by mouth once daily., Disp: 90 tablet, Rfl: 0    simvastatin (Zocor) 20 mg tablet, , Disp: , Rfl:     sodium,potassium,mag sulfates (Suprep) 17.5-3.13-1.6 gram solution, Take one bottle beginning at 6pm night before procedure and then take the other bottle 5 hours before procedure time as directed per instruction sheet, Disp: 1 each, Rfl: 0    solifenacin (VESIcare) 5 mg tablet, Take 1 tablet (5 mg) by mouth once daily. Swallow tablet whole; do not crush, chew, or split., Disp: , Rfl:     timolol (Timoptic) 0.5 % ophthalmic solution, INSTILL 1 DROP IN BOTH EYES TWICE DAILY, Disp: , Rfl:     triamcinolone (Kenalog) 0.1 % cream, Apply topically 2 times a day as needed for rash., Disp: 80 g, Rfl: 1    valACYclovir (Valtrex) 500 mg tablet, Take 2 tablets (1,000 mg) by mouth once daily as needed., Disp: , Rfl:

## 2025-06-23 NOTE — PATIENT INSTRUCTIONS
1) Aim for three meals and 2 snacks per day. Strive to eat breakfast within 1 -2 hours of waking to jump start the metabolism, prevent muscle loss, and stabilize blood glucose. Aim for breakfast at 8:00, lunch at noon to 1:00, snack at 4:00 pm,  dinner at 6:00-7:00 and a bed time snack at 10:00 pm. Eating more on a routine can even help sleep overnight.   2) Strive to include protein in the meals and even snacks. Protein in meals can increase metabolism too.  Protein in snacks can sustain energy, stabilize blood glucose, and provide more contentment. Protein foods include eggs, egg whites, cheese, nuts, nut butters, Greek yogurt, poultry, meat, fish, tofu, plant-based protein powder, and/or plant-based protein drinks.      3) Aim for a consistent carbohydrate intake with aim to work towards 45 grams of CHO at meals and 15 grams at snacks.   4) Consider consuming an Oral Nutrition Supplement (ONS) to help ensure adequate nutrition. It is recommended to consume Boost Very High Calorie (kcal 530, CHO 52, protein 22)   [facility]   Address: [id number] [place] [address] [address] [place] [address]   Phone: [phone]     Educational Handouts/Practices:   [facility]'s CHO counting guide  Next Session: Plate Method     Kat Willis, MS, RDN, LD, LACEY, MB-EAT-P   Advanced Practice Clinical Dietitian   Mindfulness-Based Eating Awareness Training Practitioner   [facility]   [facility] [place]   [email or web address]   Scheduling Line [phone]   Direct Line [phone]

## 2025-07-02 ENCOUNTER — LAB (OUTPATIENT)
Dept: LAB | Facility: HOSPITAL | Age: 70
End: 2025-07-02
Payer: MEDICARE

## 2025-07-02 DIAGNOSIS — D64.9 ANEMIA, UNSPECIFIED TYPE: ICD-10-CM

## 2025-07-02 LAB
ALBUMIN SERPL BCP-MCNC: 4 G/DL (ref 3.4–5)
ALP SERPL-CCNC: 42 U/L (ref 33–136)
ALT SERPL W P-5'-P-CCNC: 14 U/L (ref 7–45)
ANION GAP SERPL CALC-SCNC: 12 MMOL/L (ref 10–20)
AST SERPL W P-5'-P-CCNC: 14 U/L (ref 9–39)
B2 MICROGLOB SERPL-MCNC: 1.5 MG/L (ref 0.7–2.2)
BASOPHILS # BLD AUTO: 0.02 X10*3/UL (ref 0–0.1)
BASOPHILS NFR BLD AUTO: 0.5 %
BILIRUB SERPL-MCNC: 0.3 MG/DL (ref 0–1.2)
BUN SERPL-MCNC: 23 MG/DL (ref 6–23)
CALCIUM SERPL-MCNC: 9.2 MG/DL (ref 8.6–10.3)
CHLORIDE SERPL-SCNC: 106 MMOL/L (ref 98–107)
CO2 SERPL-SCNC: 25 MMOL/L (ref 21–32)
CREAT SERPL-MCNC: 0.78 MG/DL (ref 0.5–1.05)
EGFRCR SERPLBLD CKD-EPI 2021: 81 ML/MIN/1.73M*2
EOSINOPHIL # BLD AUTO: 0.07 X10*3/UL (ref 0–0.4)
EOSINOPHIL NFR BLD AUTO: 1.8 %
ERYTHROCYTE [DISTWIDTH] IN BLOOD BY AUTOMATED COUNT: 15.1 % (ref 11.5–14.5)
FERRITIN SERPL-MCNC: 45 NG/ML (ref 8–150)
FOLATE SERPL-MCNC: 21.4 NG/ML
GLUCOSE SERPL-MCNC: 117 MG/DL (ref 74–99)
HCT VFR BLD AUTO: 40.2 % (ref 36–46)
HGB BLD-MCNC: 12.7 G/DL (ref 12–16)
IGA SERPL-MCNC: 247 MG/DL (ref 70–400)
IGG SERPL-MCNC: 670 MG/DL (ref 700–1600)
IGM SERPL-MCNC: 34 MG/DL (ref 40–230)
IMM GRANULOCYTES # BLD AUTO: 0.01 X10*3/UL (ref 0–0.5)
IMM GRANULOCYTES NFR BLD AUTO: 0.3 % (ref 0–0.9)
IRON SATN MFR SERPL: 15 % (ref 25–45)
IRON SERPL-MCNC: 54 UG/DL (ref 35–150)
KAPPA LC SERPL-MCNC: 1.9 MG/DL (ref 0.33–1.94)
KAPPA LC/LAMBDA SER: 1.83 {RATIO} (ref 0.26–1.65)
LAMBDA LC SERPL-MCNC: 1.04 MG/DL (ref 0.57–2.63)
LYMPHOCYTES # BLD AUTO: 1.45 X10*3/UL (ref 0.8–3)
LYMPHOCYTES NFR BLD AUTO: 36.8 %
MCH RBC QN AUTO: 26.2 PG (ref 26–34)
MCHC RBC AUTO-ENTMCNC: 31.6 G/DL (ref 32–36)
MCV RBC AUTO: 83 FL (ref 80–100)
MONOCYTES # BLD AUTO: 0.32 X10*3/UL (ref 0.05–0.8)
MONOCYTES NFR BLD AUTO: 8.1 %
NEUTROPHILS # BLD AUTO: 2.07 X10*3/UL (ref 1.6–5.5)
NEUTROPHILS NFR BLD AUTO: 52.5 %
NRBC BLD-RTO: 0 /100 WBCS (ref 0–0)
PLATELET # BLD AUTO: 164 X10*3/UL (ref 150–450)
POTASSIUM SERPL-SCNC: 4.2 MMOL/L (ref 3.5–5.3)
PROT SERPL-MCNC: 6.1 G/DL (ref 6.4–8.2)
PROT SERPL-MCNC: 6.4 G/DL (ref 6.4–8.2)
RBC # BLD AUTO: 4.84 X10*6/UL (ref 4–5.2)
SODIUM SERPL-SCNC: 139 MMOL/L (ref 136–145)
TIBC SERPL-MCNC: 370 UG/DL (ref 240–445)
UIBC SERPL-MCNC: 316 UG/DL (ref 110–370)
VIT B12 SERPL-MCNC: 334 PG/ML (ref 211–911)
WBC # BLD AUTO: 3.9 X10*3/UL (ref 4.4–11.3)

## 2025-07-02 PROCEDURE — 82232 ASSAY OF BETA-2 PROTEIN: CPT

## 2025-07-02 PROCEDURE — 84165 PROTEIN E-PHORESIS SERUM: CPT | Performed by: STUDENT IN AN ORGANIZED HEALTH CARE EDUCATION/TRAINING PROGRAM

## 2025-07-02 PROCEDURE — 84075 ASSAY ALKALINE PHOSPHATASE: CPT

## 2025-07-02 PROCEDURE — 86334 IMMUNOFIX E-PHORESIS SERUM: CPT

## 2025-07-02 PROCEDURE — 82784 ASSAY IGA/IGD/IGG/IGM EACH: CPT

## 2025-07-02 PROCEDURE — 82728 ASSAY OF FERRITIN: CPT

## 2025-07-02 PROCEDURE — 81450 HL NEO GSAP 5-50DNA/DNA&RNA: CPT

## 2025-07-02 PROCEDURE — 85025 COMPLETE CBC W/AUTO DIFF WBC: CPT

## 2025-07-02 PROCEDURE — G0452 MOLECULAR PATHOLOGY INTERPR: HCPCS | Performed by: PATHOLOGY

## 2025-07-02 PROCEDURE — 86320 SERUM IMMUNOELECTROPHORESIS: CPT | Performed by: STUDENT IN AN ORGANIZED HEALTH CARE EDUCATION/TRAINING PROGRAM

## 2025-07-02 PROCEDURE — 83540 ASSAY OF IRON: CPT

## 2025-07-02 PROCEDURE — 83521 IG LIGHT CHAINS FREE EACH: CPT

## 2025-07-02 PROCEDURE — 82607 VITAMIN B-12: CPT

## 2025-07-02 PROCEDURE — 36415 COLL VENOUS BLD VENIPUNCTURE: CPT

## 2025-07-02 PROCEDURE — 82746 ASSAY OF FOLIC ACID SERUM: CPT

## 2025-07-02 PROCEDURE — 84155 ASSAY OF PROTEIN SERUM: CPT

## 2025-07-08 LAB
ALBUMIN: 3.6 G/DL (ref 3.4–5)
ALPHA 1 GLOBULIN: 0.3 G/DL (ref 0.2–0.6)
ALPHA 2 GLOBULIN: 0.7 G/DL (ref 0.4–1.1)
BETA GLOBULIN: 0.9 G/DL (ref 0.5–1.2)
GAMMA GLOBULIN: 0.7 G/DL (ref 0.5–1.4)
IMMUNOFIXATION COMMENT: NORMAL
PATH REVIEW - SERUM IMMUNOFIXATION: NORMAL
PATH REVIEW-SERUM PROTEIN ELECTROPHORESIS: NORMAL
PROTEIN ELECTROPHORESIS COMMENT: NORMAL

## 2025-07-09 LAB
ELECTRONICALLY SIGNED BY: NORMAL
MYELOID NGS RESULTS: NORMAL

## 2025-07-11 ENCOUNTER — APPOINTMENT (OUTPATIENT)
Dept: HEMATOLOGY/ONCOLOGY | Facility: CLINIC | Age: 70
End: 2025-07-11
Payer: MEDICARE

## 2025-07-11 VITALS
OXYGEN SATURATION: 99 % | DIASTOLIC BLOOD PRESSURE: 80 MMHG | TEMPERATURE: 96.6 F | WEIGHT: 91.38 LBS | SYSTOLIC BLOOD PRESSURE: 127 MMHG | BODY MASS INDEX: 19.1 KG/M2 | RESPIRATION RATE: 16 BRPM | HEART RATE: 75 BPM

## 2025-07-11 DIAGNOSIS — D72.819 LEUKOPENIA, UNSPECIFIED TYPE: ICD-10-CM

## 2025-07-11 DIAGNOSIS — D64.9 ANEMIA, UNSPECIFIED TYPE: Primary | ICD-10-CM

## 2025-07-11 PROCEDURE — 99205 OFFICE O/P NEW HI 60 MIN: CPT | Performed by: INTERNAL MEDICINE

## 2025-07-11 PROCEDURE — 1126F AMNT PAIN NOTED NONE PRSNT: CPT | Performed by: INTERNAL MEDICINE

## 2025-07-11 PROCEDURE — 1159F MED LIST DOCD IN RCRD: CPT | Performed by: INTERNAL MEDICINE

## 2025-07-11 PROCEDURE — 1160F RVW MEDS BY RX/DR IN RCRD: CPT | Performed by: INTERNAL MEDICINE

## 2025-07-11 PROCEDURE — 99215 OFFICE O/P EST HI 40 MIN: CPT | Performed by: INTERNAL MEDICINE

## 2025-07-11 PROCEDURE — 3074F SYST BP LT 130 MM HG: CPT | Performed by: INTERNAL MEDICINE

## 2025-07-11 PROCEDURE — 3079F DIAST BP 80-89 MM HG: CPT | Performed by: INTERNAL MEDICINE

## 2025-07-11 RX ORDER — FERROUS SULFATE 325(65) MG
325 TABLET ORAL EVERY OTHER DAY
Qty: 90 TABLET | Refills: 1 | Status: SHIPPED | OUTPATIENT
Start: 2025-07-11 | End: 2026-07-06

## 2025-07-11 RX ORDER — MAGNESIUM 200 MG
1 TABLET ORAL DAILY
Qty: 90 TABLET | Refills: 1 | Status: SHIPPED | OUTPATIENT
Start: 2025-07-11 | End: 2026-01-07

## 2025-07-11 ASSESSMENT — PAIN SCALES - GENERAL: PAINLEVEL_OUTOF10: 0-NO PAIN

## 2025-07-11 NOTE — PROGRESS NOTES
Pt seen in office today for a new patient visit with Dr. Omar Ch for management of leukopenia.She has been referred to our office by Dr. Kenney.  She is without complaints today and denies pain.     Medications, pharmacy preference and allergies were reviewed with patient and updated in the medical record by MD.     Per orders, labs were obtained on 7/2/25 and results are to be reviewed in office today by MD with patient. She is to RTC in 6 months with labs prior to her visit. Prescriptions for B12 and iron ( oral) have been sent to her pharmacy.    Our contact information was given to patient and they were encouraged to contact us with any questions or concerns.    Patient verbalized understanding and agreement regarding discussed information via verbal feedback. Pt escorted to scheduling.

## 2025-07-11 NOTE — PROGRESS NOTES
Patient ID: Nazia Warner is a 70 y.o. female.  Referring Physician: Grace Kenney MD  [address]  [facility]– [place], [place] [id number]  Primary Care Provider: Grace Kenney MD  Referral Reason: leukopenia    Subjective:  No complaints    Heme/Onc History:    07/11/25: initial hematology visit      Past Medical History: Medical History[1]  Social History:   Social History     Socioeconomic History    Marital status: Single     Spouse name: Not on file    Number of children: Not on file    Years of education: Not on file    Highest education level: Not on file   Occupational History    Not on file   Tobacco Use    Smoking status: Never    Smokeless tobacco: Never   Vaping Use    Vaping status: Never Used   Substance and Sexual Activity    Alcohol use: Never    Drug use: Never    Sexual activity: Not Currently     Partners: Male     Birth control/protection: Post-menopausal   Other Topics Concern    Not on file   Social History Narrative    Not on file     Social Drivers of Health     Financial Resource Strain: Not on file   Food Insecurity: Not on file   Transportation Needs: Not on file   Physical Activity: Not on file   Stress: Not on file   Social Connections: Not on file   Intimate Partner Violence: Not on file   Housing Stability: Not on file     Surgical History: Surgical History[2]  Family History: Family History[3]   reports that she has never smoked. She has never used smokeless tobacco.  Oncology Family history: Cancer-related family history includes Cancer (age of onset: 60 - 69) in her father; Ovarian cancer (age of onset: 80 - 99) in her cousin; Prostate cancer in her father.    Review Of Systems:  As stated per in HPI; otherwise all other 12 point ROS are negative    Physical Exam:  There were no vitals taken for this visit.  BSA: There is no height or weight on file to calculate BSA.  General: awake/alert/oriented x3, no distress, alert and  cooperative  Head: Short hair fully covering scalp. Symmetric facial expressions  Eyes: PERRL, EOMI, clear sclera, eyebrows present.  Ears/Nose/Mouth/Throat:  Oral mucous membranes moist. No oral ulcers. No palpable pre/post-auricular lymph nodes  Neck: No palpable cervical chain lymph nodes  Respiratory: unlabored breathing on room air, good chest expansion, thorax symmetric  Cardio: Regular rate and rhythm, normal S1 and S2, radial pulses symmetric  GI: Nondistended, soft, non-tender abdomen  Musculoskeletal: Normal muscle bulk and tone, ROM intact, no joint swelling.  Rises from chair and walks unassisted.  Extremities: No ankle swelling, no arm or leg wounds  Neuro: Alert, cognition intact, speech normal. Facial expressions symmetric.  No motor deficits noted. Sensation intact to touch and hot/cold.   Able to stand from seated position unassisted and walks around the room unassisted.  Psychological: Appropriate mood and behavior.  Skin: Warm and dry, no lesions, no rashes    Results:  Diagnostic Results   Lab Results   Component Value Date    WBC 3.9 (L) 07/02/2025    HGB 12.7 07/02/2025    HCT 40.2 07/02/2025    MCV 83 07/02/2025     07/02/2025     Lab Results   Component Value Date    CALCIUM 9.2 07/02/2025     07/02/2025    K 4.2 07/02/2025    CO2 25 07/02/2025     07/02/2025    BUN 23 07/02/2025    CREATININE 0.78 07/02/2025    ALT 14 07/02/2025    AST 14 07/02/2025     Current Medications[4]     Assessment/Plan:  ? Leukopenia:    Wbc has been low since 06/2024. No other cytopenias and differential is WNL. No M protein but FLC ratio is 1.83. Myeloid NGS is undetected.    She has iron def and borderline vit b12 def which may have been contributing to neutropenia.    Oral iron every other day and vit b12 sublingual every day is recommended    RTC in 6 months with labs    Diagnoses and all orders for this visit:  Anemia, unspecified type  -     Iron and TIBC; Future  -     Vitamin B12;  Future  -     Ferritin; Future  -     Comprehensive Metabolic Panel; Future  -     Folate; Future  -     CBC and Auto Differential; Future  -     Serum Protein Electrophoresis + Immunofixation; Future  -     [place]/Lambda Free Light Chain, Serum (Includes Kappa, Lambda and K/L ratio); Future  -     Beta 2 Microglobuin; Future  -     Immunoglobulins (IgG, IgA, IgM); Future  -     Myeloid Malignancies Panel - Myeloid NGS; Future  Leukopenia, unspecified type  -     Referral To Hematology and Oncology       Performance Status: Asymptomatic    I spent more than 60 minutes for the patient today, including face-to-face conversation, pre-visit preparation, post-visit orders, and others.   Omar Ch MD                              [1]   Past Medical History:  Diagnosis Date    Allergic rhinitis     Cataract 2021    Diabetes mellitus (Multi) 2005    Fibrocystic breast 4/2023    Fibroid     GERD (gastroesophageal reflux disease) 2015    Glaucoma 2018    Kidney stone 1/6/2020    Sleep difficulties     Tinnitus 2015    Urinary incontinence 6/2022   [2]   Past Surgical History:  Procedure Laterality Date    WISDOM TOOTH EXTRACTION  1980   [3]   Family History  Problem Relation Name Age of Onset    Diabetes Mother Lina Warner     Hypertension Mother Lina Warner     Cancer Father Sabas Warner 60 - 69    Hypertension Father Sabas Warner     Heart disease Father Sabas Warner     Prostate cancer Father Sabas Warner     Ovarian cancer Cousin 2nd cousin 80 - 99    Diabetes Maternal Grandfather Maricruz Drummond     Diabetes Sister Sister    [4]   Current Outpatient Medications:     betamethasone dipropionate (Diprosone) 0.05 % lotion, APPLY TO THE AFFECTED AREA ON SCALP ONCE DAILY, Disp: , Rfl:     cetirizine (ZyrTEC) 10 mg tablet, 1 (one) time each day at the same time, Disp: , Rfl:     clobetasol (Temovate) 0.05 % external solution, Apply twice daily to area on scalp, Disp: 50 mL, Rfl: 3    co-enzyme Q-10 30  mg capsule, Take 2 capsules (60 mg) by mouth once daily., Disp: , Rfl:     dapagliflozin propanediol (Farxiga) 5 mg, Take 1 tablet (5 mg) by mouth once daily in the morning., Disp: 90 tablet, Rfl: 2    diclofenac sodium (Voltaren) 1 % gel, APPLY TO FEET FOR PAIN RELIEF, Disp: , Rfl:     meclizine (Antivert) 25 mg tablet, Take 0.5 tablets (12.5 mg) by mouth every 8 hours if needed for dizziness., Disp: , Rfl:     metFORMIN XR (Glucophage-XR) 500 mg 24 hr tablet, Take 1 tablet (500 mg) by mouth 3 times a day. Take 1 tab in the morning and 2 tabs in the evening, Disp: , Rfl:     minoxidil (Loniten) 10 mg tablet, CRUSH 4 TABLETS INTO BETAMETHASONE LOTION AND APPLY TO THE AFFECTED AREA ON SCALP EVERY DAY. DO NOT SWALLOW, Disp: , Rfl:     Nitro-Bid 2 % ointment, APPLY A PEA SIZED AMOUNT TO TOP OF FEET ONCE DAILY. WEAR GLOVES FOR APPLICATION., Disp: , Rfl:     OneTouch Delica Plus Lancet 30 gauge misc, USE AS DIRECTED TO CHECK BLOOD SUGAR TWICE DAILY, Disp: , Rfl:     OneTouch Verio Flex meter misc, USE AS DIRECTED TO CHECK BLOOD SUGAR TWICE DAILY, Disp: , Rfl:     simvastatin (Zocor) 20 mg tablet, , Disp: , Rfl:     solifenacin (VESIcare) 5 mg tablet, Take 1 tablet (5 mg) by mouth once daily. Swallow tablet whole; do not crush, chew, or split., Disp: , Rfl:     timolol (Timoptic) 0.5 % ophthalmic solution, INSTILL 1 DROP IN BOTH EYES TWICE DAILY, Disp: , Rfl:     valACYclovir (Valtrex) 500 mg tablet, Take 2 tablets (1,000 mg) by mouth once daily as needed., Disp: , Rfl:

## 2025-07-15 ENCOUNTER — HOSPITAL ENCOUNTER (OUTPATIENT)
Dept: RADIOLOGY | Facility: CLINIC | Age: 70
Discharge: HOME | End: 2025-07-15
Payer: MEDICARE

## 2025-07-15 ENCOUNTER — APPOINTMENT (OUTPATIENT)
Dept: PRIMARY CARE | Facility: CLINIC | Age: 70
End: 2025-07-15
Payer: MEDICARE

## 2025-07-15 VITALS
OXYGEN SATURATION: 97 % | DIASTOLIC BLOOD PRESSURE: 69 MMHG | BODY MASS INDEX: 19.52 KG/M2 | WEIGHT: 93 LBS | SYSTOLIC BLOOD PRESSURE: 121 MMHG | HEART RATE: 54 BPM | HEIGHT: 58 IN

## 2025-07-15 DIAGNOSIS — M25.552 LEFT HIP PAIN: ICD-10-CM

## 2025-07-15 DIAGNOSIS — E11.9 TYPE 2 DIABETES MELLITUS WITHOUT COMPLICATION, WITHOUT LONG-TERM CURRENT USE OF INSULIN: Primary | ICD-10-CM

## 2025-07-15 DIAGNOSIS — Z78.0 ASYMPTOMATIC MENOPAUSAL STATE: ICD-10-CM

## 2025-07-15 DIAGNOSIS — M85.80 OSTEOPENIA, UNSPECIFIED LOCATION: ICD-10-CM

## 2025-07-15 PROCEDURE — 73521 X-RAY EXAM HIPS BI 2 VIEWS: CPT

## 2025-07-15 PROCEDURE — 3074F SYST BP LT 130 MM HG: CPT | Performed by: STUDENT IN AN ORGANIZED HEALTH CARE EDUCATION/TRAINING PROGRAM

## 2025-07-15 PROCEDURE — 1159F MED LIST DOCD IN RCRD: CPT | Performed by: STUDENT IN AN ORGANIZED HEALTH CARE EDUCATION/TRAINING PROGRAM

## 2025-07-15 PROCEDURE — 73521 X-RAY EXAM HIPS BI 2 VIEWS: CPT | Mod: BILATERAL PROCEDURE | Performed by: RADIOLOGY

## 2025-07-15 PROCEDURE — 3008F BODY MASS INDEX DOCD: CPT | Performed by: STUDENT IN AN ORGANIZED HEALTH CARE EDUCATION/TRAINING PROGRAM

## 2025-07-15 PROCEDURE — 3078F DIAST BP <80 MM HG: CPT | Performed by: STUDENT IN AN ORGANIZED HEALTH CARE EDUCATION/TRAINING PROGRAM

## 2025-07-15 PROCEDURE — 99214 OFFICE O/P EST MOD 30 MIN: CPT | Performed by: STUDENT IN AN ORGANIZED HEALTH CARE EDUCATION/TRAINING PROGRAM

## 2025-07-15 RX ORDER — METFORMIN HYDROCHLORIDE 500 MG/1
500 TABLET, EXTENDED RELEASE ORAL 3 TIMES DAILY
Qty: 270 TABLET | Refills: 1 | Status: SHIPPED | OUTPATIENT
Start: 2025-07-15

## 2025-07-15 ASSESSMENT — ENCOUNTER SYMPTOMS
OCCASIONAL FEELINGS OF UNSTEADINESS: 0
DEPRESSION: 1
LOSS OF SENSATION IN FEET: 0

## 2025-07-15 NOTE — PROGRESS NOTES
"Subjective   Patient ID: Nazia Warner is a 70 y.o. female who presents for Follow-up.  HPI   Ms. Mandujano is here for a follow-up.  No concerns endorsed     Chronic active medical problems   hyperlipidemia  Hypertension simvastatin   Dm: metformin 500 TID ; farxiga- observe albuminuria  had a head injury; went to ER;: CT showed incidental 1cm menigioma :follow up in 1 year for neuro durgery         Downtrending WBC for the past 1 to 2 years.  No weight loss or appetite loss reported.  No night sweats reported._ hem onc consult     Other labs discussed-mild albuminuria.  Will continue to observe with Farxiga.  Blood pressure within normal limits.  Will discuss further medications such as ACEs or ARB's or increasing Farxiga dosage based on next lab  Patient agreeable to plan      Medical History[1]   Surgical History[2]   Family History[3]   Allergies[4]       Occupation:     Review of Systems    Objective   Visit Vitals  /69 (BP Location: Left arm, Patient Position: Sitting, BP Cuff Size: Adult)   Pulse 54   Ht (!) 1.473 m (4' 10\")   Wt (!) 42.2 kg (93 lb)   SpO2 97%   BMI 19.44 kg/m²   OB Status Postmenopausal   Smoking Status Never   BSA 1.31 m²      Physical Exam    Assessment/Plan   {Assess/PlanSmartLinks:93666}                [1]  Past Medical History:  Diagnosis Date   • Allergic rhinitis    • Cataract 2021   • Diabetes mellitus (Multi) 2005   • Fibrocystic breast 4/2023   • Fibroid    • GERD (gastroesophageal reflux disease) 2015   • Glaucoma 2018   • Kidney stone 1/6/2020   • Sleep difficulties    • Tinnitus 2015   • Urinary incontinence 6/2022   [2]  Past Surgical History:  Procedure Laterality Date   • WISDOM TOOTH EXTRACTION  1980   [3]  Family History  Problem Relation Name Age of Onset   • Diabetes Mother Lina Warner    • Hypertension Mother Lina Warner    • Cancer Father Sabas Warner 60 - 69   • Hypertension Father Sabas Warner    • Heart disease Father Sabas Warner    • " Conjuntivae and eyelids appear normal , Sclerae : White without injection Prostate cancer Father Sabas Warner    • Ovarian cancer Cousin 2nd cousin 80 - 99   • Diabetes Maternal Grandfather Maricruz Drummond    • Diabetes Sister Sister    [4]  Allergies  Allergen Reactions   • Iodinated Contrast Media Rash and Hives   • Iodine Rash and Wheezing     Other reaction(s): Unknown   • Penicillins Rash and Hives   • Sulfa (Sulfonamide Antibiotics) Hives, Itching, Rash and Unknown     A   • Monosodium Glutamate Other     Dizziness. 6/16/15.bw    Other reaction(s): Other   Dizziness. 6/16/15.bw   • Amoxicillin Unknown    WISDOM TOOTH EXTRACTION  1980   [3]   Family History  Problem Relation Name Age of Onset    Diabetes Mother Lina Warner     Hypertension Mother Lina Warner     Cancer Father Sabas Warner 60 - 69    Hypertension Father Sabas Warner     Heart disease Father Sabas Warner     Prostate cancer Father Sabas Warner     Ovarian cancer Cousin 2nd cousin 80 - 99    Diabetes Maternal Grandfather Maricruz Drummond     Diabetes Sister Sister    [4]   Allergies  Allergen Reactions    Iodinated Contrast Media Rash and Hives    Iodine Rash and Wheezing     Other reaction(s): Unknown    Penicillins Rash and Hives    Sulfa (Sulfonamide Antibiotics) Hives, Itching, Rash and Unknown     A    Monosodium Glutamate Other     Dizziness. 6/16/15.bw    Other reaction(s): Other   Dizziness. 6/16/15.bw    Amoxicillin Unknown

## 2025-07-22 ENCOUNTER — TREATMENT (OUTPATIENT)
Dept: PHYSICAL THERAPY | Facility: CLINIC | Age: 70
End: 2025-07-22
Payer: MEDICARE

## 2025-07-22 DIAGNOSIS — N39.41 URGE INCONTINENCE OF URINE: ICD-10-CM

## 2025-07-22 PROCEDURE — 97530 THERAPEUTIC ACTIVITIES: CPT | Mod: GP | Performed by: PHYSICAL THERAPIST

## 2025-07-22 PROCEDURE — 97110 THERAPEUTIC EXERCISES: CPT | Mod: GP | Performed by: PHYSICAL THERAPIST

## 2025-07-22 NOTE — PROGRESS NOTES
Physical Therapy    PELVIC FLOOR TREATMENT and DISCHARGE    Name: Nazia Warner  MRN: [id number]  : 1955  Today's Date: 25     Time Calculation  Start Time: 1125  Stop Time: 1150  Time Calculation (min): 25 min       PT Therapeutic Procedures Time Entry  Therapeutic Exercise Time Entry: 10  Therapeutic Activity Time Entry: 13     Visit: 6  Insurance: [employer]  Auth: No Auth  $25 copay    Assessment:   At this time, progress has plateaued. Discussed continued HEP and will monitor. Pt will be discharged to HEP       Plan:   Discharge to HEP       Current Problem:  1. Urge incontinence of urine  Follow Up In Physical Therapy            Subjective   Pt states she had single episode where she lost her bladder, she still notes UUI symptoms  Environmental triggers tend to be so strong she is unable to override them and has leakage     She is back on voiding scheduled where she will void every 2-3 hrs and that has been helpful     Objective     EXTERNAL OBSERVATION:  Voluntary Contraction: present   Voluntary Relaxation: incomplete  Involuntary Contraction: present  Involuntary Relaxation incomplete        INTERNAL VAGINAL EXAMINATION:  PFM Strength: 3/5      INTERNAL PALPATION:   Tenderness with palpation of bulbocavernosus, ischiocavernosus, levator ani group bilat   R more tenderness vs L   6/10 discomfort per pt    Tension noted throughout pelvic floor with difficulty with eccentric lengthening following Kegel contraction       EXTERNAL PALPATION:  Levator Ani: No Pain/Tightness R, no Pain/Tightness L  Bulbo: no Pain/Tightness R, no Pain/Tightness L  Ischiocavernosus: no Pain/Tightness R, no Pain/Tightness L  Transverse perineum: no Pain/Tightness R, no Pain/Tightness L      TREATMENT  Therapeutic activity:  Review of urgency control techniques  Review and instruction  on continued timed voiding if that is what is the most helpful to reduce UUI     Therapeutic exercise:  Cat cow x 10   Child's pose  stretch x 10 with 3 hold  Lumbar flexion x 10 with 3 hold   Lumbar side bending x 10 R      Careplan Goals:        Pedro Burns, PT

## 2025-08-04 ASSESSMENT — ENCOUNTER SYMPTOMS
ACTIVITY CHANGE: 0
SHORTNESS OF BREATH: 0
WEAKNESS: 0
NUMBNESS: 0
WHEEZING: 0
NAUSEA: 0
CONSTIPATION: 0
DIZZINESS: 0
HEMATURIA: 0
COUGH: 0
VOMITING: 0
FEVER: 0
ABDOMINAL PAIN: 0
DYSURIA: 0
SPEECH DIFFICULTY: 0

## 2025-08-18 ENCOUNTER — APPOINTMENT (OUTPATIENT)
Dept: NUTRITION | Facility: CLINIC | Age: 70
End: 2025-08-18
Payer: MEDICARE

## 2025-08-18 VITALS — WEIGHT: 90.7 LBS | HEIGHT: 58 IN | BODY MASS INDEX: 19.04 KG/M2

## 2025-08-18 DIAGNOSIS — E46 PROTEIN-CALORIE MALNUTRITION, UNSPECIFIED SEVERITY (MULTI): ICD-10-CM

## 2025-08-18 DIAGNOSIS — E11.9 TYPE 2 DIABETES MELLITUS WITHOUT COMPLICATION, WITHOUT LONG-TERM CURRENT USE OF INSULIN: Primary | ICD-10-CM

## 2025-08-18 PROCEDURE — 97803 MED NUTRITION INDIV SUBSEQ: CPT | Performed by: DIETITIAN, REGISTERED

## 2025-09-04 ENCOUNTER — PATIENT MESSAGE (OUTPATIENT)
Dept: UROLOGY | Facility: HOSPITAL | Age: 70
End: 2025-09-04
Payer: MEDICARE

## 2025-09-04 DIAGNOSIS — N20.0 RENAL STONES: ICD-10-CM

## 2025-09-19 ENCOUNTER — APPOINTMENT (OUTPATIENT)
Dept: RADIOLOGY | Facility: CLINIC | Age: 70
End: 2025-09-19
Payer: MEDICARE

## 2025-10-30 ENCOUNTER — APPOINTMENT (OUTPATIENT)
Dept: RADIOLOGY | Facility: CLINIC | Age: 70
End: 2025-10-30
Payer: MEDICARE

## 2026-01-29 ENCOUNTER — APPOINTMENT (OUTPATIENT)
Dept: PRIMARY CARE | Facility: CLINIC | Age: 71
End: 2026-01-29
Payer: MEDICARE

## 2026-03-03 ENCOUNTER — APPOINTMENT (OUTPATIENT)
Dept: PRIMARY CARE | Facility: CLINIC | Age: 71
End: 2026-03-03
Payer: MEDICARE

## 2026-03-04 ENCOUNTER — APPOINTMENT (OUTPATIENT)
Dept: PRIMARY CARE | Facility: CLINIC | Age: 71
End: 2026-03-04
Payer: MEDICARE